# Patient Record
Sex: FEMALE | Race: WHITE | URBAN - METROPOLITAN AREA
[De-identification: names, ages, dates, MRNs, and addresses within clinical notes are randomized per-mention and may not be internally consistent; named-entity substitution may affect disease eponyms.]

---

## 2017-05-05 ENCOUNTER — HISTORICAL (OUTPATIENT)
Dept: ADMINISTRATIVE | Facility: HOSPITAL | Age: 82
End: 2017-05-05

## 2017-05-05 LAB
ABS NEUT (OLG): 6.15
ALBUMIN SERPL-MCNC: 3.3 GM/DL (ref 3.4–5)
ALBUMIN/GLOB SERPL: 1 RATIO (ref 1.1–2)
ALP SERPL-CCNC: 87 UNIT/L (ref 50–136)
ALT SERPL-CCNC: 27 UNIT/L (ref 12–78)
APTT PPP: 23.8 SECOND(S) (ref 23–32)
AST SERPL-CCNC: 25 UNIT/L (ref 10–37)
BASOPHILS # BLD AUTO: 0.02 X10(3)/MCL
BASOPHILS NFR BLD AUTO: 0.2 %
BILIRUB SERPL-MCNC: 0.3 MG/DL (ref 0.2–1)
BILIRUBIN DIRECT+TOT PNL SERPL-MCNC: 0.08 MG/DL (ref 0.05–0.2)
BILIRUBIN DIRECT+TOT PNL SERPL-MCNC: 0.22 MG/DL
BUN SERPL-MCNC: 15 MG/DL (ref 7–18)
CALCIUM SERPL-MCNC: 8 MG/DL (ref 8.5–10.1)
CHLORIDE SERPL-SCNC: 105 MMOL/L (ref 98–107)
CO2 SERPL-SCNC: 26.7 MMOL/L (ref 21–32)
CREAT SERPL-MCNC: 0.95 MG/DL (ref 0.55–1.02)
EOSINOPHIL # BLD AUTO: 0.3 X10(3)/MCL
EOSINOPHIL NFR BLD AUTO: 3.6 %
ERYTHROCYTE [DISTWIDTH] IN BLOOD BY AUTOMATED COUNT: 19 %
GLOBULIN SER-MCNC: 3.2 GM/DL (ref 2.4–3.5)
GLUCOSE SERPL-MCNC: 148 MG/DL (ref 74–106)
HCT VFR BLD AUTO: 34.7 % (ref 34–46)
HGB BLD-MCNC: 10.9 GM/DL (ref 11.3–15.4)
IMM GRANULOCYTES # BLD AUTO: 0.03 10*3/UL (ref 0–0.1)
IMM GRANULOCYTES NFR BLD AUTO: 0.4 % (ref 0–1)
INR PPP: 0.95
LYMPHOCYTES # BLD AUTO: 0.93 X10(3)/MCL
LYMPHOCYTES NFR BLD AUTO: 11.2 %
MCH RBC QN AUTO: 28.8 PG (ref 27–33)
MCHC RBC AUTO-ENTMCNC: 31.4 GM/DL (ref 32–35)
MCV RBC AUTO: 91.8 FL (ref 81–97)
MONOCYTES # BLD AUTO: 0.89 X10(3)/MCL
MONOCYTES NFR BLD AUTO: 10.7 %
NEUTROPHILS # BLD AUTO: 6.15 X10(3)/MCL
NEUTROPHILS NFR BLD AUTO: 73.9 %
PLATELET # BLD AUTO: 269 X10(3)/MCL (ref 151–368)
PMV BLD AUTO: 10 FL
POTASSIUM SERPL-SCNC: 4 MMOL/L (ref 3.5–5.1)
PROT SERPL-MCNC: 6.5 GM/DL (ref 6.4–8.2)
PROTHROMBIN TIME: 9.6 SECOND(S) (ref 9–11.5)
RBC # BLD AUTO: 3.78 X10(6)/MCL (ref 3.9–5)
SODIUM SERPL-SCNC: 138 MMOL/L (ref 136–145)
WBC # SPEC AUTO: 8.32 X10(3)/MCL (ref 3.4–9.2)

## 2017-05-17 ENCOUNTER — HISTORICAL (OUTPATIENT)
Dept: ADMINISTRATIVE | Facility: HOSPITAL | Age: 82
End: 2017-05-17

## 2017-06-08 ENCOUNTER — HISTORICAL (OUTPATIENT)
Dept: ADMINISTRATIVE | Facility: HOSPITAL | Age: 82
End: 2017-06-08

## 2017-06-08 LAB — DEPRECATED CALCIDIOL+CALCIFEROL SERPL-MC: 44.13 NG/ML (ref 30–80)

## 2017-07-03 ENCOUNTER — HISTORICAL (OUTPATIENT)
Dept: ADMINISTRATIVE | Facility: HOSPITAL | Age: 82
End: 2017-07-03

## 2017-07-03 LAB
ABS NEUT (OLG): 5.95
ALBUMIN SERPL-MCNC: 3.6 GM/DL (ref 3.4–5)
ALBUMIN/GLOB SERPL: 1 RATIO (ref 1.1–2)
ALP SERPL-CCNC: 104 UNIT/L (ref 50–136)
ALT SERPL-CCNC: 38 UNIT/L (ref 12–78)
AST SERPL-CCNC: 33 UNIT/L (ref 10–37)
BASOPHILS # BLD AUTO: 0.02 X10(3)/MCL
BASOPHILS NFR BLD AUTO: 0.3 %
BILIRUB SERPL-MCNC: 0.6 MG/DL (ref 0.2–1)
BILIRUBIN DIRECT+TOT PNL SERPL-MCNC: 0.16 MG/DL (ref 0.05–0.2)
BILIRUBIN DIRECT+TOT PNL SERPL-MCNC: 0.44 MG/DL
BUN SERPL-MCNC: 14 MG/DL (ref 7–18)
CALCIUM SERPL-MCNC: 9.4 MG/DL (ref 8.5–10.1)
CHLORIDE SERPL-SCNC: 102 MMOL/L (ref 98–107)
CO2 SERPL-SCNC: 29.6 MMOL/L (ref 21–32)
CREAT SERPL-MCNC: 0.99 MG/DL (ref 0.55–1.02)
EOSINOPHIL # BLD AUTO: 0.34 X10(3)/MCL
EOSINOPHIL NFR BLD AUTO: 4.3 %
ERYTHROCYTE [DISTWIDTH] IN BLOOD BY AUTOMATED COUNT: 16 %
GLOBULIN SER-MCNC: 3.6 GM/DL (ref 2.4–3.5)
GLUCOSE SERPL-MCNC: 93 MG/DL (ref 74–106)
HCT VFR BLD AUTO: 38.5 % (ref 34–46)
HGB BLD-MCNC: 12.3 GM/DL (ref 11.3–15.4)
IMM GRANULOCYTES # BLD AUTO: 0.02 10*3/UL (ref 0–0.1)
IMM GRANULOCYTES NFR BLD AUTO: 0.3 % (ref 0–1)
LYMPHOCYTES # BLD AUTO: 1.18 X10(3)/MCL
LYMPHOCYTES NFR BLD AUTO: 14.9 %
MCH RBC QN AUTO: 30 PG (ref 27–33)
MCHC RBC AUTO-ENTMCNC: 31.9 GM/DL (ref 32–35)
MCV RBC AUTO: 93.9 FL (ref 81–97)
MONOCYTES # BLD AUTO: 0.43 X10(3)/MCL
MONOCYTES NFR BLD AUTO: 5.4 %
NEUTROPHILS # BLD AUTO: 5.95 X10(3)/MCL
NEUTROPHILS NFR BLD AUTO: 74.8 %
PLATELET # BLD AUTO: 308 X10(3)/MCL (ref 151–368)
PMV BLD AUTO: 10 FL
POTASSIUM SERPL-SCNC: 3.8 MMOL/L (ref 3.5–5.1)
PROT SERPL-MCNC: 7.2 GM/DL (ref 6.4–8.2)
RBC # BLD AUTO: 4.1 X10(6)/MCL (ref 3.9–5)
SODIUM SERPL-SCNC: 141 MMOL/L (ref 136–145)
WBC # SPEC AUTO: 7.94 X10(3)/MCL (ref 3.4–9.2)

## 2017-10-06 ENCOUNTER — HISTORICAL (OUTPATIENT)
Dept: ADMINISTRATIVE | Facility: HOSPITAL | Age: 82
End: 2017-10-06

## 2017-10-06 LAB
ABS NEUT (OLG): 3.33
ALBUMIN SERPL-MCNC: 3.4 GM/DL (ref 3.4–5)
ALBUMIN/GLOB SERPL: 1 RATIO (ref 1.1–2)
ALP SERPL-CCNC: 75 UNIT/L (ref 50–136)
ALT SERPL-CCNC: 28 UNIT/L (ref 12–78)
AST SERPL-CCNC: 22 UNIT/L (ref 10–37)
BASOPHILS # BLD AUTO: 0.01 X10(3)/MCL
BASOPHILS NFR BLD AUTO: 0.2 %
BILIRUB SERPL-MCNC: 0.5 MG/DL (ref 0.2–1)
BILIRUBIN DIRECT+TOT PNL SERPL-MCNC: 0.12 MG/DL (ref 0.05–0.2)
BILIRUBIN DIRECT+TOT PNL SERPL-MCNC: 0.38 MG/DL
BUN SERPL-MCNC: 16 MG/DL (ref 7–18)
CALCIUM SERPL-MCNC: 8.8 MG/DL (ref 8.5–10.1)
CHLORIDE SERPL-SCNC: 106 MMOL/L (ref 98–107)
CO2 SERPL-SCNC: 29 MMOL/L (ref 21–32)
CREAT SERPL-MCNC: 0.85 MG/DL (ref 0.55–1.02)
EOSINOPHIL # BLD AUTO: 0.57 X10(3)/MCL
EOSINOPHIL NFR BLD AUTO: 9.8 %
ERYTHROCYTE [DISTWIDTH] IN BLOOD BY AUTOMATED COUNT: 17 %
GLOBULIN SER-MCNC: 3.3 GM/DL (ref 2.4–3.5)
GLUCOSE SERPL-MCNC: 93 MG/DL (ref 74–106)
HCT VFR BLD AUTO: 35.2 % (ref 34–46)
HGB BLD-MCNC: 11.2 GM/DL (ref 11.3–15.4)
IMM GRANULOCYTES # BLD AUTO: 0.02 10*3/UL (ref 0–0.1)
IMM GRANULOCYTES NFR BLD AUTO: 0.3 % (ref 0–1)
LYMPHOCYTES # BLD AUTO: 1.02 X10(3)/MCL
LYMPHOCYTES NFR BLD AUTO: 17.6 %
MCH RBC QN AUTO: 29.6 PG (ref 27–33)
MCHC RBC AUTO-ENTMCNC: 31.8 GM/DL (ref 32–35)
MCV RBC AUTO: 93.1 FL (ref 81–97)
MONOCYTES # BLD AUTO: 0.86 X10(3)/MCL
MONOCYTES NFR BLD AUTO: 14.8 %
NEUTROPHILS # BLD AUTO: 3.33 X10(3)/MCL
NEUTROPHILS NFR BLD AUTO: 57.3 %
PLATELET # BLD AUTO: 242 X10(3)/MCL (ref 151–368)
PMV BLD AUTO: 10 FL
POTASSIUM SERPL-SCNC: 4 MMOL/L (ref 3.5–5.1)
PROT SERPL-MCNC: 6.7 GM/DL (ref 6.4–8.2)
RBC # BLD AUTO: 3.78 X10(6)/MCL (ref 3.9–5)
SODIUM SERPL-SCNC: 141 MMOL/L (ref 136–145)
WBC # SPEC AUTO: 5.81 X10(3)/MCL (ref 3.4–9.2)

## 2018-05-01 ENCOUNTER — HISTORICAL (OUTPATIENT)
Dept: ADMINISTRATIVE | Facility: HOSPITAL | Age: 83
End: 2018-05-01

## 2018-05-20 ENCOUNTER — HISTORICAL (OUTPATIENT)
Dept: ADMINISTRATIVE | Facility: HOSPITAL | Age: 83
End: 2018-05-20

## 2018-05-20 LAB
ABS NEUT (OLG): 7.2 X10(3)/MCL (ref 2.1–9.2)
ALBUMIN SERPL-MCNC: 3.4 GM/DL (ref 3.4–5)
ALBUMIN/GLOB SERPL: 1.3 {RATIO}
ALP SERPL-CCNC: 65 UNIT/L (ref 38–126)
ALT SERPL-CCNC: 38 UNIT/L (ref 12–78)
APPEARANCE, UA: CLEAR
APTT PPP: 20.1 SECOND(S) (ref 24.8–36.9)
AST SERPL-CCNC: 50 UNIT/L (ref 15–37)
BACTERIA SPEC CULT: ABNORMAL /HPF
BASOPHILS # BLD AUTO: 0 X10(3)/MCL (ref 0–0.2)
BASOPHILS NFR BLD AUTO: 0 %
BILIRUB SERPL-MCNC: 0.3 MG/DL (ref 0.2–1)
BILIRUB UR QL STRIP: NEGATIVE
BILIRUBIN DIRECT+TOT PNL SERPL-MCNC: 0.1 MG/DL (ref 0–0.5)
BILIRUBIN DIRECT+TOT PNL SERPL-MCNC: 0.2 MG/DL (ref 0–0.8)
BUN SERPL-MCNC: 19 MG/DL (ref 7–18)
CALCIUM SERPL-MCNC: 8.2 MG/DL (ref 8.5–10.1)
CHLORIDE SERPL-SCNC: 106 MMOL/L (ref 98–107)
CO2 SERPL-SCNC: 27 MMOL/L (ref 21–32)
COLOR UR: YELLOW
CREAT SERPL-MCNC: 1.11 MG/DL (ref 0.55–1.02)
EOSINOPHIL # BLD AUTO: 0.2 X10(3)/MCL (ref 0–0.9)
EOSINOPHIL NFR BLD AUTO: 2 %
ERYTHROCYTE [DISTWIDTH] IN BLOOD BY AUTOMATED COUNT: 16.4 % (ref 11.5–17)
GLOBULIN SER-MCNC: 2.7 GM/DL (ref 2.4–3.5)
GLUCOSE (UA): NEGATIVE
GLUCOSE SERPL-MCNC: 137 MG/DL (ref 74–106)
HCT VFR BLD AUTO: 35.9 % (ref 37–47)
HGB BLD-MCNC: 11.4 GM/DL (ref 12–16)
HGB UR QL STRIP: NEGATIVE
INR PPP: 0.93 (ref 0–1.27)
KETONES UR QL STRIP: ABNORMAL
LEUKOCYTE ESTERASE UR QL STRIP: NEGATIVE
LYMPHOCYTES # BLD AUTO: 0.9 X10(3)/MCL (ref 0.6–4.6)
LYMPHOCYTES NFR BLD AUTO: 10 %
MCH RBC QN AUTO: 31.1 PG (ref 27–31)
MCHC RBC AUTO-ENTMCNC: 31.8 GM/DL (ref 33–36)
MCV RBC AUTO: 97.8 FL (ref 80–94)
MONOCYTES # BLD AUTO: 0.6 X10(3)/MCL (ref 0.1–1.3)
MONOCYTES NFR BLD AUTO: 6 %
NEUTROPHILS # BLD AUTO: 7.2 X10(3)/MCL (ref 1.4–7.9)
NEUTROPHILS NFR BLD AUTO: 80 %
NITRITE UR QL STRIP: NEGATIVE
PH UR STRIP: 6 [PH] (ref 5–9)
PLATELET # BLD AUTO: 232 X10(3)/MCL (ref 130–400)
PMV BLD AUTO: 10.4 FL (ref 9.4–12.4)
POTASSIUM SERPL-SCNC: 4.3 MMOL/L (ref 3.5–5.1)
PROT SERPL-MCNC: 6.1 GM/DL (ref 6.4–8.2)
PROT UR QL STRIP: NEGATIVE
PROTHROMBIN TIME: 12.7 SECOND(S) (ref 12.2–14.7)
RBC # BLD AUTO: 3.67 X10(6)/MCL (ref 4.2–5.4)
RBC #/AREA URNS HPF: ABNORMAL /[HPF]
SODIUM SERPL-SCNC: 141 MMOL/L (ref 136–145)
SP GR UR STRIP: 1.02 (ref 1–1.03)
SQUAMOUS EPITHELIAL, UA: ABNORMAL
UROBILINOGEN UR STRIP-ACNC: 1
WBC # SPEC AUTO: 8.9 X10(3)/MCL (ref 4.5–11.5)
WBC #/AREA URNS HPF: ABNORMAL /[HPF]

## 2018-05-21 LAB
BUN SERPL-MCNC: 19 MG/DL (ref 7–18)
CALCIUM SERPL-MCNC: 7.2 MG/DL (ref 8.5–10.1)
CHLORIDE SERPL-SCNC: 106 MMOL/L (ref 98–107)
CO2 SERPL-SCNC: 26 MMOL/L (ref 21–32)
CREAT SERPL-MCNC: 1.03 MG/DL (ref 0.55–1.02)
CREAT/UREA NIT SERPL: 18.4
CROSSMATCH INTERPRETATION: NORMAL
GLUCOSE SERPL-MCNC: 177 MG/DL (ref 74–106)
GROUP & RH: NORMAL
HCT VFR BLD AUTO: 24.2 % (ref 37–47)
HGB BLD-MCNC: 7.5 GM/DL (ref 12–16)
POTASSIUM SERPL-SCNC: 4.2 MMOL/L (ref 3.5–5.1)
PRODUCT READY: NORMAL
SODIUM SERPL-SCNC: 141 MMOL/L (ref 136–145)

## 2018-05-22 LAB
ABS NEUT (OLG): 10.47 X10(3)/MCL (ref 2.1–9.2)
BASOPHILS # BLD AUTO: 0 X10(3)/MCL (ref 0–0.2)
BASOPHILS NFR BLD AUTO: 0 %
BUN SERPL-MCNC: 17 MG/DL (ref 7–18)
CALCIUM SERPL-MCNC: 7.3 MG/DL (ref 8.5–10.1)
CHLORIDE SERPL-SCNC: 105 MMOL/L (ref 98–107)
CO2 SERPL-SCNC: 27 MMOL/L (ref 21–32)
CREAT SERPL-MCNC: 0.87 MG/DL (ref 0.55–1.02)
CREAT/UREA NIT SERPL: 19.5
EOSINOPHIL # BLD AUTO: 0 X10(3)/MCL (ref 0–0.9)
EOSINOPHIL NFR BLD AUTO: 0 %
ERYTHROCYTE [DISTWIDTH] IN BLOOD BY AUTOMATED COUNT: 19.6 % (ref 11.5–17)
GLUCOSE SERPL-MCNC: 119 MG/DL (ref 74–106)
HCT VFR BLD AUTO: 28.8 % (ref 37–47)
HGB BLD-MCNC: 9.4 GM/DL (ref 12–16)
LYMPHOCYTES # BLD AUTO: 0.9 X10(3)/MCL (ref 0.6–4.6)
LYMPHOCYTES NFR BLD AUTO: 7 %
MCH RBC QN AUTO: 29.8 PG (ref 27–31)
MCHC RBC AUTO-ENTMCNC: 32.6 GM/DL (ref 33–36)
MCV RBC AUTO: 91.4 FL (ref 80–94)
MONOCYTES # BLD AUTO: 0.9 X10(3)/MCL (ref 0.1–1.3)
MONOCYTES NFR BLD AUTO: 7 %
NEUTROPHILS # BLD AUTO: 10.47 X10(3)/MCL (ref 1.4–7.9)
NEUTROPHILS NFR BLD AUTO: 84 %
PLATELET # BLD AUTO: 135 X10(3)/MCL (ref 130–400)
PMV BLD AUTO: 10.7 FL (ref 9.4–12.4)
POTASSIUM SERPL-SCNC: 3.9 MMOL/L (ref 3.5–5.1)
RBC # BLD AUTO: 3.15 X10(6)/MCL (ref 4.2–5.4)
SODIUM SERPL-SCNC: 137 MMOL/L (ref 136–145)
WBC # SPEC AUTO: 12.4 X10(3)/MCL (ref 4.5–11.5)

## 2018-05-23 LAB
ABS NEUT (OLG): 8.41 X10(3)/MCL (ref 2.1–9.2)
BASOPHILS # BLD AUTO: 0 X10(3)/MCL (ref 0–0.2)
BASOPHILS NFR BLD AUTO: 0 %
BUN SERPL-MCNC: 13 MG/DL (ref 7–18)
CALCIUM SERPL-MCNC: 7.1 MG/DL (ref 8.5–10.1)
CHLORIDE SERPL-SCNC: 107 MMOL/L (ref 98–107)
CO2 SERPL-SCNC: 27 MMOL/L (ref 21–32)
CREAT SERPL-MCNC: 0.7 MG/DL (ref 0.55–1.02)
CREAT/UREA NIT SERPL: 18.6
EOSINOPHIL # BLD AUTO: 0.1 X10(3)/MCL (ref 0–0.9)
EOSINOPHIL NFR BLD AUTO: 1 %
ERYTHROCYTE [DISTWIDTH] IN BLOOD BY AUTOMATED COUNT: 18.8 % (ref 11.5–17)
GLUCOSE SERPL-MCNC: 98 MG/DL (ref 74–106)
HCT VFR BLD AUTO: 25.7 % (ref 37–47)
HGB BLD-MCNC: 8.2 GM/DL (ref 12–16)
LYMPHOCYTES # BLD AUTO: 1 X10(3)/MCL (ref 0.6–4.6)
LYMPHOCYTES NFR BLD AUTO: 9 %
MCH RBC QN AUTO: 29.3 PG (ref 27–31)
MCHC RBC AUTO-ENTMCNC: 31.9 GM/DL (ref 33–36)
MCV RBC AUTO: 91.8 FL (ref 80–94)
MONOCYTES # BLD AUTO: 1.1 X10(3)/MCL (ref 0.1–1.3)
MONOCYTES NFR BLD AUTO: 10 %
NEUTROPHILS # BLD AUTO: 8.41 X10(3)/MCL (ref 2.1–9.2)
NEUTROPHILS NFR BLD AUTO: 78 %
PLATELET # BLD AUTO: 121 X10(3)/MCL (ref 130–400)
PMV BLD AUTO: 10.6 FL (ref 9.4–12.4)
POTASSIUM SERPL-SCNC: 3.6 MMOL/L (ref 3.5–5.1)
RBC # BLD AUTO: 2.8 X10(6)/MCL (ref 4.2–5.4)
SODIUM SERPL-SCNC: 140 MMOL/L (ref 136–145)
WBC # SPEC AUTO: 10.8 X10(3)/MCL (ref 4.5–11.5)

## 2018-05-24 LAB
ABS NEUT (OLG): 8.89
BASOPHILS # BLD AUTO: 0.02 X10(3)/MCL
BASOPHILS NFR BLD AUTO: 0.2 %
EOSINOPHIL # BLD AUTO: 0.13 X10(3)/MCL
EOSINOPHIL NFR BLD AUTO: 1.2 %
ERYTHROCYTE [DISTWIDTH] IN BLOOD BY AUTOMATED COUNT: 19 %
HCT VFR BLD AUTO: 25.5 % (ref 34–46)
HGB BLD-MCNC: 8.4 GM/DL (ref 11.3–15.4)
IMM GRANULOCYTES # BLD AUTO: 0.14 10*3/UL (ref 0–0.1)
IMM GRANULOCYTES NFR BLD AUTO: 1.3 % (ref 0–1)
LYMPHOCYTES # BLD AUTO: 0.74 X10(3)/MCL
LYMPHOCYTES NFR BLD AUTO: 6.7 %
MCH RBC QN AUTO: 29.9 PG (ref 27–33)
MCHC RBC AUTO-ENTMCNC: 32.9 GM/DL (ref 32–35)
MCV RBC AUTO: 90.7 FL (ref 81–97)
MONOCYTES # BLD AUTO: 1.17 X10(3)/MCL
MONOCYTES NFR BLD AUTO: 10.6 %
NEUTROPHILS # BLD AUTO: 8.89 X10(3)/MCL
NEUTROPHILS NFR BLD AUTO: 80 %
PLATELET # BLD AUTO: 173 X10(3)/MCL (ref 151–368)
PMV BLD AUTO: 10 FL
RBC # BLD AUTO: 2.81 X10(6)/MCL (ref 3.9–5)
WBC # SPEC AUTO: 11.09 X10(3)/MCL (ref 3.4–9.2)

## 2018-05-25 ENCOUNTER — HISTORICAL (OUTPATIENT)
Dept: ADMINISTRATIVE | Facility: HOSPITAL | Age: 83
End: 2018-05-25

## 2018-05-25 LAB
ABS NEUT (OLG): 8.11
BASOPHILS # BLD AUTO: 0.04 X10(3)/MCL
BASOPHILS NFR BLD AUTO: 0.4 %
BUN SERPL-MCNC: 15 MG/DL (ref 7–18)
CALCIUM SERPL-MCNC: 7.4 MG/DL (ref 8.5–10.1)
CHLORIDE SERPL-SCNC: 105 MMOL/L (ref 98–107)
CO2 SERPL-SCNC: 28 MMOL/L (ref 21–32)
CREAT SERPL-MCNC: 0.75 MG/DL (ref 0.55–1.02)
CREAT/UREA NIT SERPL: 20
EOSINOPHIL # BLD AUTO: 0.26 X10(3)/MCL
EOSINOPHIL NFR BLD AUTO: 2.4 %
ERYTHROCYTE [DISTWIDTH] IN BLOOD BY AUTOMATED COUNT: 18 %
GLUCOSE SERPL-MCNC: 108 MG/DL (ref 74–106)
HCT VFR BLD AUTO: 25.8 % (ref 34–46)
HGB BLD-MCNC: 8.5 GM/DL (ref 11.3–15.4)
IMM GRANULOCYTES # BLD AUTO: 0.17 10*3/UL (ref 0–0.1)
IMM GRANULOCYTES NFR BLD AUTO: 1.6 % (ref 0–1)
LYMPHOCYTES # BLD AUTO: 0.83 X10(3)/MCL
LYMPHOCYTES NFR BLD AUTO: 7.6 %
MCH RBC QN AUTO: 29.6 PG (ref 27–33)
MCHC RBC AUTO-ENTMCNC: 32.9 GM/DL (ref 32–35)
MCV RBC AUTO: 89.9 FL (ref 81–97)
MONOCYTES # BLD AUTO: 1.5 X10(3)/MCL
MONOCYTES NFR BLD AUTO: 13.7 %
NEUTROPHILS # BLD AUTO: 8.11 X10(3)/MCL
NEUTROPHILS NFR BLD AUTO: 74.3 %
PLATELET # BLD AUTO: 199 X10(3)/MCL (ref 151–368)
PMV BLD AUTO: 10 FL
POTASSIUM SERPL-SCNC: 3.9 MMOL/L (ref 3.5–5.1)
RBC # BLD AUTO: 2.87 X10(6)/MCL (ref 3.9–5)
SODIUM SERPL-SCNC: 137 MMOL/L (ref 136–145)
WBC # SPEC AUTO: 10.91 X10(3)/MCL (ref 3.4–9.2)

## 2018-05-26 LAB
GROUP & RH: NORMAL
HCT VFR BLD AUTO: 26.3 % (ref 34–46)
HGB BLD-MCNC: 8.7 GM/DL (ref 11.3–15.4)

## 2018-05-29 LAB
ABS NEUT (OLG): 10.4
ERYTHROCYTE [DISTWIDTH] IN BLOOD BY AUTOMATED COUNT: 18 %
GRANULOCYTES NFR BLD MANUAL: 79 % (ref 47–80)
HCT VFR BLD AUTO: 27.4 % (ref 34–46)
HGB BLD-MCNC: 9 GM/DL (ref 11.3–15.4)
LYMPHOCYTES NFR BLD MANUAL: 10 % (ref 13–40)
MACROCYTES BLD QL SMEAR: SLIGHT
MCH RBC QN AUTO: 29.9 PG (ref 27–33)
MCHC RBC AUTO-ENTMCNC: 32.8 GM/DL (ref 32–35)
MCV RBC AUTO: 91 FL (ref 81–97)
METAMYELOCYTES NFR BLD MANUAL: 1 %
MONOCYTES NFR BLD MANUAL: 6 % (ref 2–11)
NEUTS BAND NFR BLD MANUAL: 4 % (ref 0–11)
PLATELET # BLD AUTO: 390 X10(3)/MCL (ref 151–368)
PMV BLD AUTO: 9 FL
RBC # BLD AUTO: 3.01 X10(6)/MCL (ref 3.9–5)
WBC # SPEC AUTO: 13.83 X10(3)/MCL (ref 3.4–9.2)

## 2018-05-31 LAB
ABS NEUT (OLG): 8.76
ANISOCYTOSIS BLD QL SMEAR: ABNORMAL
ERYTHROCYTE [DISTWIDTH] IN BLOOD BY AUTOMATED COUNT: 18 %
HCT VFR BLD AUTO: 26.6 % (ref 34–46)
HGB BLD-MCNC: 8.6 GM/DL (ref 11.3–15.4)
LYMPHOCYTES NFR BLD MANUAL: 16 % (ref 13–40)
LYMPHOCYTES NFR BLD MANUAL: 2 %
MACROCYTES BLD QL SMEAR: SLIGHT
MCH RBC QN AUTO: 29.9 PG (ref 27–33)
MCHC RBC AUTO-ENTMCNC: 32.3 GM/DL (ref 32–35)
MCV RBC AUTO: 92.4 FL (ref 81–97)
MICROCYTES BLD QL SMEAR: ABNORMAL
MONOCYTES NFR BLD MANUAL: 4 % (ref 2–11)
NEUTROPHILS NFR BLD MANUAL: 78 % (ref 47–80)
NEUTS BAND NFR BLD MANUAL: 2 % (ref 0–11)
PLATELET # BLD AUTO: 424 X10(3)/MCL (ref 151–368)
PLATELET # BLD EST: ABNORMAL 10*3/UL
PMV BLD AUTO: 10 FL
POLYCHROMASIA BLD QL SMEAR: SLIGHT
RBC # BLD AUTO: 2.88 X10(6)/MCL (ref 3.9–5)
RBC MORPH BLD: ABNORMAL
WBC # SPEC AUTO: 12.59 X10(3)/MCL (ref 3.4–9.2)

## 2018-06-01 LAB
ABS NEUT (OLG): 13.53
ANISOCYTOSIS BLD QL SMEAR: ABNORMAL
BUN SERPL-MCNC: 18 MG/DL (ref 7–18)
CALCIUM SERPL-MCNC: 8.5 MG/DL (ref 8.5–10.1)
CHLORIDE SERPL-SCNC: 101 MMOL/L (ref 98–107)
CO2 SERPL-SCNC: 29.9 MMOL/L (ref 21–32)
CREAT SERPL-MCNC: 0.89 MG/DL (ref 0.55–1.02)
CREAT/UREA NIT SERPL: 20
EOSINOPHIL NFR BLD MANUAL: 2 % (ref 0–8)
ERYTHROCYTE [DISTWIDTH] IN BLOOD BY AUTOMATED COUNT: 18 %
GLUCOSE SERPL-MCNC: 114 MG/DL (ref 74–106)
GRANULOCYTES NFR BLD MANUAL: 85 % (ref 47–80)
HCT VFR BLD AUTO: 29.2 % (ref 34–46)
HGB BLD-MCNC: 9.4 GM/DL (ref 11.3–15.4)
LYMPHOCYTES NFR BLD MANUAL: 4 % (ref 13–40)
MCH RBC QN AUTO: 29.8 PG (ref 27–33)
MCHC RBC AUTO-ENTMCNC: 32.2 GM/DL (ref 32–35)
MCV RBC AUTO: 92.7 FL (ref 81–97)
MICROCYTES BLD QL SMEAR: SLIGHT
MONOCYTES NFR BLD MANUAL: 8 % (ref 2–11)
NEUTS BAND NFR BLD MANUAL: 1 % (ref 0–11)
PLATELET # BLD AUTO: 526 X10(3)/MCL (ref 151–368)
PLATELET # BLD EST: ABNORMAL 10*3/UL
PMV BLD AUTO: 10 FL
POTASSIUM SERPL-SCNC: 3.8 MMOL/L (ref 3.5–5.1)
RBC # BLD AUTO: 3.15 X10(6)/MCL (ref 3.9–5)
SODIUM SERPL-SCNC: 137 MMOL/L (ref 136–145)
WBC # SPEC AUTO: 17.67 X10(3)/MCL (ref 3.4–9.2)

## 2018-06-03 LAB
ABS NEUT (OLG): 10.17
BASOPHILS # BLD AUTO: 0.05 X10(3)/MCL
BASOPHILS NFR BLD AUTO: 0.4 %
EOSINOPHIL # BLD AUTO: 0.47 X10(3)/MCL
EOSINOPHIL NFR BLD AUTO: 3.8 %
ERYTHROCYTE [DISTWIDTH] IN BLOOD BY AUTOMATED COUNT: 18 %
HCT VFR BLD AUTO: 30.6 % (ref 34–46)
HGB BLD-MCNC: 9.7 GM/DL (ref 11.3–15.4)
IMM GRANULOCYTES # BLD AUTO: 0.21 10*3/UL (ref 0–0.1)
IMM GRANULOCYTES NFR BLD AUTO: 1.7 % (ref 0–1)
LYMPHOCYTES # BLD AUTO: 0.63 X10(3)/MCL
LYMPHOCYTES NFR BLD AUTO: 5.1 %
MCH RBC QN AUTO: 29.4 PG (ref 27–33)
MCHC RBC AUTO-ENTMCNC: 31.7 GM/DL (ref 32–35)
MCV RBC AUTO: 92.7 FL (ref 81–97)
MONOCYTES # BLD AUTO: 0.71 X10(3)/MCL
MONOCYTES NFR BLD AUTO: 5.8 %
NEUTROPHILS # BLD AUTO: 10.17 X10(3)/MCL
NEUTROPHILS NFR BLD AUTO: 83.2 %
PLATELET # BLD AUTO: 601 X10(3)/MCL (ref 151–368)
PMV BLD AUTO: 10 FL
RBC # BLD AUTO: 3.3 X10(6)/MCL (ref 3.9–5)
WBC # SPEC AUTO: 12.24 X10(3)/MCL (ref 3.4–9.2)

## 2018-06-08 LAB
ABS NEUT (OLG): 8.32
BASOPHILS # BLD AUTO: 0.06 X10(3)/MCL
BASOPHILS NFR BLD AUTO: 0.5 %
BUN SERPL-MCNC: 11 MG/DL (ref 7–18)
CALCIUM SERPL-MCNC: 7.9 MG/DL (ref 8.5–10.1)
CHLORIDE SERPL-SCNC: 105 MMOL/L (ref 98–107)
CO2 SERPL-SCNC: 29.8 MMOL/L (ref 21–32)
CREAT SERPL-MCNC: 0.83 MG/DL (ref 0.55–1.02)
CREAT/UREA NIT SERPL: 13
EOSINOPHIL # BLD AUTO: 0.58 X10(3)/MCL
EOSINOPHIL NFR BLD AUTO: 5 %
ERYTHROCYTE [DISTWIDTH] IN BLOOD BY AUTOMATED COUNT: 18 %
GLUCOSE SERPL-MCNC: 102 MG/DL (ref 74–106)
HCT VFR BLD AUTO: 30.8 % (ref 34–46)
HGB BLD-MCNC: 9.8 GM/DL (ref 11.3–15.4)
IMM GRANULOCYTES # BLD AUTO: 0.22 10*3/UL (ref 0–0.1)
IMM GRANULOCYTES NFR BLD AUTO: 1.9 % (ref 0–1)
LYMPHOCYTES # BLD AUTO: 0.93 X10(3)/MCL
LYMPHOCYTES NFR BLD AUTO: 8.1 %
MCH RBC QN AUTO: 30 PG (ref 27–33)
MCHC RBC AUTO-ENTMCNC: 31.8 GM/DL (ref 32–35)
MCV RBC AUTO: 94.2 FL (ref 81–97)
MONOCYTES # BLD AUTO: 1.43 X10(3)/MCL
MONOCYTES NFR BLD AUTO: 12.4 %
NEUTROPHILS # BLD AUTO: 8.32 X10(3)/MCL
NEUTROPHILS NFR BLD AUTO: 72.1 %
PLATELET # BLD AUTO: 523 X10(3)/MCL (ref 151–368)
PMV BLD AUTO: 10 FL
POTASSIUM SERPL-SCNC: 3.9 MMOL/L (ref 3.5–5.1)
RBC # BLD AUTO: 3.27 X10(6)/MCL (ref 3.9–5)
SODIUM SERPL-SCNC: 139 MMOL/L (ref 136–145)
WBC # SPEC AUTO: 11.54 X10(3)/MCL (ref 3.4–9.2)

## 2018-06-15 LAB
ABS NEUT (OLG): 6.1
BASOPHILS # BLD AUTO: 0.05 X10(3)/MCL
BASOPHILS NFR BLD AUTO: 0.5 %
BUN SERPL-MCNC: 16 MG/DL (ref 7–18)
CALCIUM SERPL-MCNC: 8.4 MG/DL (ref 8.5–10.1)
CHLORIDE SERPL-SCNC: 103 MMOL/L (ref 98–107)
CO2 SERPL-SCNC: 30.5 MMOL/L (ref 21–32)
CREAT SERPL-MCNC: 0.88 MG/DL (ref 0.55–1.02)
CREAT/UREA NIT SERPL: 18
EOSINOPHIL # BLD AUTO: 0.74 X10(3)/MCL
EOSINOPHIL NFR BLD AUTO: 7.6 %
ERYTHROCYTE [DISTWIDTH] IN BLOOD BY AUTOMATED COUNT: 18 %
GLUCOSE SERPL-MCNC: 95 MG/DL (ref 74–106)
HCT VFR BLD AUTO: 32.7 % (ref 34–46)
HGB BLD-MCNC: 10.1 GM/DL (ref 11.3–15.4)
IMM GRANULOCYTES # BLD AUTO: 0.28 10*3/UL (ref 0–0.1)
IMM GRANULOCYTES NFR BLD AUTO: 2.9 % (ref 0–1)
LYMPHOCYTES # BLD AUTO: 1.3 X10(3)/MCL
LYMPHOCYTES NFR BLD AUTO: 13.4 %
MCH RBC QN AUTO: 29.3 PG (ref 27–33)
MCHC RBC AUTO-ENTMCNC: 30.9 GM/DL (ref 32–35)
MCV RBC AUTO: 94.8 FL (ref 81–97)
MONOCYTES # BLD AUTO: 1.26 X10(3)/MCL
MONOCYTES NFR BLD AUTO: 12.9 %
NEUTROPHILS # BLD AUTO: 6.1 X10(3)/MCL
NEUTROPHILS NFR BLD AUTO: 62.7 %
PLATELET # BLD AUTO: 347 X10(3)/MCL (ref 151–368)
PMV BLD AUTO: 10 FL
POTASSIUM SERPL-SCNC: 4.3 MMOL/L (ref 3.5–5.1)
RBC # BLD AUTO: 3.45 X10(6)/MCL (ref 3.9–5)
SODIUM SERPL-SCNC: 138 MMOL/L (ref 136–145)
WBC # SPEC AUTO: 9.73 X10(3)/MCL (ref 3.4–9.2)

## 2018-06-22 LAB
ABS NEUT (OLG): 7.78
BASOPHILS # BLD AUTO: 0.06 X10(3)/MCL
BASOPHILS NFR BLD AUTO: 0.5 %
BUN SERPL-MCNC: 26 MG/DL (ref 7–18)
CALCIUM SERPL-MCNC: 8.2 MG/DL (ref 8.5–10.1)
CHLORIDE SERPL-SCNC: 103 MMOL/L (ref 98–107)
CO2 SERPL-SCNC: 29.9 MMOL/L (ref 21–32)
CREAT SERPL-MCNC: 0.93 MG/DL (ref 0.55–1.02)
CREAT/UREA NIT SERPL: 28
EOSINOPHIL # BLD AUTO: 0.38 X10(3)/MCL
EOSINOPHIL NFR BLD AUTO: 3.4 %
ERYTHROCYTE [DISTWIDTH] IN BLOOD BY AUTOMATED COUNT: 18 %
GLUCOSE SERPL-MCNC: 92 MG/DL (ref 74–106)
HCT VFR BLD AUTO: 32.8 % (ref 34–46)
HGB BLD-MCNC: 10.1 GM/DL (ref 11.3–15.4)
IMM GRANULOCYTES # BLD AUTO: 0.22 10*3/UL (ref 0–0.1)
IMM GRANULOCYTES NFR BLD AUTO: 2 % (ref 0–1)
LYMPHOCYTES # BLD AUTO: 1.64 X10(3)/MCL
LYMPHOCYTES NFR BLD AUTO: 14.6 %
MCH RBC QN AUTO: 29.3 PG (ref 27–33)
MCHC RBC AUTO-ENTMCNC: 30.8 GM/DL (ref 32–35)
MCV RBC AUTO: 95.1 FL (ref 81–97)
MONOCYTES # BLD AUTO: 1.15 X10(3)/MCL
MONOCYTES NFR BLD AUTO: 10.2 %
NEUTROPHILS # BLD AUTO: 7.78 X10(3)/MCL
NEUTROPHILS NFR BLD AUTO: 69.3 %
PLATELET # BLD AUTO: 290 X10(3)/MCL (ref 151–368)
PMV BLD AUTO: 10 FL
POTASSIUM SERPL-SCNC: 4.2 MMOL/L (ref 3.5–5.1)
RBC # BLD AUTO: 3.45 X10(6)/MCL (ref 3.9–5)
SODIUM SERPL-SCNC: 138 MMOL/L (ref 136–145)
WBC # SPEC AUTO: 11.23 X10(3)/MCL (ref 3.4–9.2)

## 2018-07-09 ENCOUNTER — HISTORICAL (OUTPATIENT)
Dept: ADMINISTRATIVE | Facility: HOSPITAL | Age: 83
End: 2018-07-09

## 2018-07-23 ENCOUNTER — HISTORICAL (OUTPATIENT)
Dept: ADMINISTRATIVE | Facility: HOSPITAL | Age: 83
End: 2018-07-23

## 2018-07-23 LAB
ABS NEUT (OLG): 5.97
ALBUMIN SERPL-MCNC: 3.4 GM/DL (ref 3.4–5)
ALBUMIN/GLOB SERPL: 0.9 RATIO (ref 1.1–2)
ALP SERPL-CCNC: 139 UNIT/L (ref 46–116)
ALT SERPL-CCNC: 35 UNIT/L (ref 12–78)
AST SERPL-CCNC: 38 UNIT/L (ref 10–37)
BASOPHILS # BLD AUTO: 0.02 X10(3)/MCL
BASOPHILS NFR BLD AUTO: 0.3 %
BILIRUB SERPL-MCNC: 0.4 MG/DL (ref 0.2–1)
BILIRUBIN DIRECT+TOT PNL SERPL-MCNC: 0.14 MG/DL (ref 0–0.2)
BILIRUBIN DIRECT+TOT PNL SERPL-MCNC: 0.26 MG/DL
BUN SERPL-MCNC: 13 MG/DL (ref 7–18)
CALCIUM SERPL-MCNC: 9.2 MG/DL (ref 8.5–10.1)
CHLORIDE SERPL-SCNC: 104 MMOL/L (ref 98–107)
CO2 SERPL-SCNC: 32.5 MMOL/L (ref 21–32)
CREAT SERPL-MCNC: 0.89 MG/DL (ref 0.55–1.02)
DEPRECATED CALCIDIOL+CALCIFEROL SERPL-MC: 78.6 NG/ML (ref 30–100)
EOSINOPHIL # BLD AUTO: 0.23 X10(3)/MCL
EOSINOPHIL NFR BLD AUTO: 3 %
ERYTHROCYTE [DISTWIDTH] IN BLOOD BY AUTOMATED COUNT: 18 %
ERYTHROCYTE [SEDIMENTATION RATE] IN BLOOD: 21 MM/HR (ref 0–20)
GLOBULIN SER-MCNC: 3.8 GM/DL (ref 2.4–3.5)
GLUCOSE SERPL-MCNC: 91 MG/DL (ref 74–106)
HCT VFR BLD AUTO: 39 % (ref 34–46)
HGB BLD-MCNC: 12.5 GM/DL (ref 11.3–15.4)
IMM GRANULOCYTES # BLD AUTO: 0.02 10*3/UL (ref 0–0.1)
IMM GRANULOCYTES NFR BLD AUTO: 0.3 % (ref 0–1)
LYMPHOCYTES # BLD AUTO: 0.94 X10(3)/MCL
LYMPHOCYTES NFR BLD AUTO: 12.2 %
MAGNESIUM SERPL-MCNC: 1.9 MG/DL (ref 1.8–2.4)
MCH RBC QN AUTO: 30 PG (ref 27–33)
MCHC RBC AUTO-ENTMCNC: 32.1 GM/DL (ref 32–35)
MCV RBC AUTO: 93.5 FL (ref 81–97)
MONOCYTES # BLD AUTO: 0.54 X10(3)/MCL
MONOCYTES NFR BLD AUTO: 7 %
NEUTROPHILS # BLD AUTO: 5.97 X10(3)/MCL
NEUTROPHILS NFR BLD AUTO: 77.2 %
PLATELET # BLD AUTO: 299 X10(3)/MCL (ref 151–368)
PMV BLD AUTO: 10 FL
POTASSIUM SERPL-SCNC: 4 MMOL/L (ref 3.5–5.1)
PROT SERPL-MCNC: 7.2 GM/DL (ref 6.4–8.2)
RBC # BLD AUTO: 4.17 X10(6)/MCL (ref 3.9–5)
SODIUM SERPL-SCNC: 140 MMOL/L (ref 136–145)
WBC # SPEC AUTO: 7.72 X10(3)/MCL (ref 3.4–9.2)

## 2018-08-20 ENCOUNTER — HISTORICAL (OUTPATIENT)
Dept: ADMINISTRATIVE | Facility: HOSPITAL | Age: 83
End: 2018-08-20

## 2018-09-04 ENCOUNTER — HISTORICAL (OUTPATIENT)
Dept: ADMINISTRATIVE | Facility: HOSPITAL | Age: 83
End: 2018-09-04

## 2018-09-04 LAB
ABS NEUT (OLG): 6.43
APTT PPP: 23 SECOND(S) (ref 24.5–32.8)
BASOPHILS # BLD AUTO: 0.03 X10(3)/MCL
BASOPHILS NFR BLD AUTO: 0.3 %
EOSINOPHIL # BLD AUTO: 0.33 X10(3)/MCL
EOSINOPHIL NFR BLD AUTO: 3.8 %
ERYTHROCYTE [DISTWIDTH] IN BLOOD BY AUTOMATED COUNT: 18 %
HCT VFR BLD AUTO: 41.9 % (ref 34–46)
HGB BLD-MCNC: 13.4 GM/DL (ref 11.3–15.4)
IMM GRANULOCYTES # BLD AUTO: 0.03 10*3/UL (ref 0–0.1)
IMM GRANULOCYTES NFR BLD AUTO: 0.3 % (ref 0–1)
INR PPP: 0.9
LYMPHOCYTES # BLD AUTO: 1.16 X10(3)/MCL
LYMPHOCYTES NFR BLD AUTO: 13.3 %
MCH RBC QN AUTO: 29.3 PG (ref 27–33)
MCHC RBC AUTO-ENTMCNC: 32 GM/DL (ref 32–35)
MCV RBC AUTO: 91.5 FL (ref 81–97)
MONOCYTES # BLD AUTO: 0.74 X10(3)/MCL
MONOCYTES NFR BLD AUTO: 8.5 %
NEUTROPHILS # BLD AUTO: 6.43 X10(3)/MCL
NEUTROPHILS NFR BLD AUTO: 73.8 %
PLATELET # BLD AUTO: 341 X10(3)/MCL (ref 151–368)
PMV BLD AUTO: 10 FL
PROTHROMBIN TIME: 9.4 SECOND(S) (ref 9.3–11.4)
RBC # BLD AUTO: 4.58 X10(6)/MCL (ref 3.9–5)
WBC # SPEC AUTO: 8.72 X10(3)/MCL (ref 3.4–9.2)

## 2018-09-13 ENCOUNTER — HISTORICAL (OUTPATIENT)
Dept: ADMINISTRATIVE | Facility: HOSPITAL | Age: 83
End: 2018-09-13

## 2018-09-13 LAB — TSH SERPL-ACNC: 0.69 MIU/ML (ref 0.35–3.75)

## 2018-09-28 ENCOUNTER — HISTORICAL (OUTPATIENT)
Dept: ADMINISTRATIVE | Facility: HOSPITAL | Age: 83
End: 2018-09-28

## 2018-09-28 LAB
ABS NEUT (OLG): 5.42
ALBUMIN SERPL-MCNC: 3.5 GM/DL (ref 3.4–5)
ALBUMIN/GLOB SERPL: 0.9 RATIO (ref 1.1–2)
ALP SERPL-CCNC: 132 UNIT/L (ref 46–116)
ALT SERPL-CCNC: 29 UNIT/L (ref 12–78)
APPEARANCE, UA: CLEAR
AST SERPL-CCNC: 27 UNIT/L (ref 10–37)
BACTERIA SPEC CULT: NORMAL
BASOPHILS # BLD AUTO: 0.02 X10(3)/MCL
BASOPHILS NFR BLD AUTO: 0.2 %
BILIRUB SERPL-MCNC: 0.3 MG/DL (ref 0.2–1)
BILIRUB UR QL STRIP: NEGATIVE
BILIRUBIN DIRECT+TOT PNL SERPL-MCNC: 0.1 MG/DL (ref 0–0.2)
BILIRUBIN DIRECT+TOT PNL SERPL-MCNC: 0.2 MG/DL
BUN SERPL-MCNC: 12 MG/DL (ref 7–18)
CALCIUM SERPL-MCNC: 8.4 MG/DL (ref 8.5–10.1)
CHLORIDE SERPL-SCNC: 101 MMOL/L (ref 98–107)
CHOLEST SERPL-MCNC: 203 MG/DL (ref 50–200)
CHOLEST/HDLC SERPL: 3 {RATIO} (ref 0–5)
CO2 SERPL-SCNC: 28.9 MMOL/L (ref 21–32)
COLOR UR: YELLOW
CREAT SERPL-MCNC: 0.69 MG/DL (ref 0.55–1.02)
EOSINOPHIL # BLD AUTO: 0.47 X10(3)/MCL
EOSINOPHIL NFR BLD AUTO: 5.8 %
ERYTHROCYTE [DISTWIDTH] IN BLOOD BY AUTOMATED COUNT: 18 %
EST. AVERAGE GLUCOSE BLD GHB EST-MCNC: 123 MG/DL
GLOBULIN SER-MCNC: 3.8 GM/DL (ref 2.4–3.5)
GLUCOSE (UA): NEGATIVE
GLUCOSE SERPL-MCNC: 96 MG/DL (ref 74–106)
HBA1C MFR BLD: 5.9 % (ref 4.5–6.2)
HCT VFR BLD AUTO: 38.5 % (ref 34–46)
HDLC SERPL-MCNC: 66 MG/DL (ref 35–60)
HGB BLD-MCNC: 12.4 GM/DL (ref 11.3–15.4)
HGB UR QL STRIP: NEGATIVE
IMM GRANULOCYTES # BLD AUTO: 0.02 10*3/UL (ref 0–0.1)
IMM GRANULOCYTES NFR BLD AUTO: 0.2 % (ref 0–1)
KETONES UR QL STRIP: NORMAL
LDLC SERPL CALC-MCNC: 115 MG/DL (ref 50–140)
LEUKOCYTE ESTERASE UR QL STRIP: NEGATIVE
LYMPHOCYTES # BLD AUTO: 1.04 X10(3)/MCL
LYMPHOCYTES NFR BLD AUTO: 12.9 %
MCH RBC QN AUTO: 29.9 PG (ref 27–33)
MCHC RBC AUTO-ENTMCNC: 32.2 GM/DL (ref 32–35)
MCV RBC AUTO: 92.8 FL (ref 81–97)
MONOCYTES # BLD AUTO: 1.11 X10(3)/MCL
MONOCYTES NFR BLD AUTO: 13.7 %
MUCOUS THREADS URNS QL MICRO: PRESENT
NEUTROPHILS # BLD AUTO: 5.42 X10(3)/MCL
NEUTROPHILS NFR BLD AUTO: 67.2 %
NITRITE UR QL STRIP: NEGATIVE
PH UR STRIP: 6.5 [PH] (ref 4.6–8)
PLATELET # BLD AUTO: 324 X10(3)/MCL (ref 151–368)
PMV BLD AUTO: 10 FL
POTASSIUM SERPL-SCNC: 3.9 MMOL/L (ref 3.5–5.1)
PROT SERPL-MCNC: 7.3 GM/DL (ref 6.4–8.2)
PROT UR QL STRIP: NEGATIVE
RBC # BLD AUTO: 4.15 X10(6)/MCL (ref 3.9–5)
RBC #/AREA URNS HPF: NORMAL /[HPF]
SODIUM SERPL-SCNC: 136 MMOL/L (ref 136–145)
SP GR UR STRIP: 1.02 (ref 1–1.03)
SQUAMOUS EPITHELIAL, UA: NORMAL
TRIGL SERPL-MCNC: 111 MG/DL (ref 30–150)
UROBILINOGEN UR STRIP-ACNC: 0.2
VIT B12 SERPL-MCNC: 1005 PG/ML (ref 193–986)
VLDLC SERPL CALC-MCNC: 22 MG/DL
WBC # SPEC AUTO: 8.08 X10(3)/MCL (ref 3.4–9.2)
WBC #/AREA URNS HPF: NORMAL /HPF

## 2018-11-20 ENCOUNTER — HISTORICAL (OUTPATIENT)
Dept: ADMINISTRATIVE | Facility: HOSPITAL | Age: 83
End: 2018-11-20

## 2019-01-14 ENCOUNTER — HISTORICAL (OUTPATIENT)
Dept: ADMINISTRATIVE | Facility: HOSPITAL | Age: 84
End: 2019-01-14

## 2019-01-14 LAB
ABS NEUT (OLG): 5.98
ALBUMIN SERPL-MCNC: 3.3 GM/DL (ref 3.4–5)
ALBUMIN/GLOB SERPL: 0.9 RATIO (ref 1.1–2)
ALP SERPL-CCNC: 114 UNIT/L (ref 46–116)
ALT SERPL-CCNC: 32 UNIT/L (ref 12–78)
APPEARANCE, UA: CLEAR
AST SERPL-CCNC: 28 UNIT/L (ref 10–37)
BACTERIA SPEC CULT: NORMAL
BASOPHILS # BLD AUTO: 0.04 X10(3)/MCL
BASOPHILS NFR BLD AUTO: 0.5 %
BILIRUB SERPL-MCNC: 0.5 MG/DL (ref 0.2–1)
BILIRUB UR QL STRIP: NEGATIVE
BILIRUBIN DIRECT+TOT PNL SERPL-MCNC: 0.13 MG/DL (ref 0–0.2)
BILIRUBIN DIRECT+TOT PNL SERPL-MCNC: 0.37 MG/DL
BUN SERPL-MCNC: 14 MG/DL (ref 7–18)
CALCIUM SERPL-MCNC: 9.5 MG/DL (ref 8.5–10.1)
CHLORIDE SERPL-SCNC: 98 MMOL/L (ref 98–107)
CO2 SERPL-SCNC: 32.8 MMOL/L (ref 21–32)
COLOR UR: YELLOW
CREAT SERPL-MCNC: 0.95 MG/DL (ref 0.55–1.02)
EOSINOPHIL # BLD AUTO: 0.35 X10(3)/MCL
EOSINOPHIL NFR BLD AUTO: 4.2 %
ERYTHROCYTE [DISTWIDTH] IN BLOOD BY AUTOMATED COUNT: 16 %
GLOBULIN SER-MCNC: 3.7 GM/DL (ref 2.4–3.5)
GLUCOSE (UA): NEGATIVE
GLUCOSE SERPL-MCNC: 95 MG/DL (ref 74–106)
HCT VFR BLD AUTO: 41 % (ref 34–46)
HGB BLD-MCNC: 12.9 GM/DL (ref 11.3–15.4)
HGB UR QL STRIP: NEGATIVE
IMM GRANULOCYTES # BLD AUTO: 0.04 10*3/UL (ref 0–0.1)
IMM GRANULOCYTES NFR BLD AUTO: 0.5 % (ref 0–1)
KETONES UR QL STRIP: NORMAL
LEUKOCYTE ESTERASE UR QL STRIP: NEGATIVE
LYMPHOCYTES # BLD AUTO: 1.15 X10(3)/MCL
LYMPHOCYTES NFR BLD AUTO: 13.9 %
MCH RBC QN AUTO: 30.5 PG (ref 27–33)
MCHC RBC AUTO-ENTMCNC: 31.5 GM/DL (ref 32–35)
MCV RBC AUTO: 96.9 FL (ref 81–97)
MONOCYTES # BLD AUTO: 0.72 X10(3)/MCL
MONOCYTES NFR BLD AUTO: 8.7 %
MUCOUS THREADS URNS QL MICRO: PRESENT
NEUTROPHILS # BLD AUTO: 5.98 X10(3)/MCL
NEUTROPHILS NFR BLD AUTO: 72.2 %
NITRITE UR QL STRIP: NEGATIVE
PH UR STRIP: 6 [PH] (ref 4.6–8)
PLATELET # BLD AUTO: 287 X10(3)/MCL (ref 151–368)
PMV BLD AUTO: 10 FL
POTASSIUM SERPL-SCNC: 3.6 MMOL/L (ref 3.5–5.1)
PROT SERPL-MCNC: 7 GM/DL (ref 6.4–8.2)
PROT UR QL STRIP: NEGATIVE
RBC # BLD AUTO: 4.23 X10(6)/MCL (ref 3.9–5)
RBC #/AREA URNS HPF: NORMAL /[HPF]
SODIUM SERPL-SCNC: 138 MMOL/L (ref 136–145)
SP GR UR STRIP: >=1.03 (ref 1–1.03)
SQUAMOUS EPITHELIAL, UA: NORMAL
UROBILINOGEN UR STRIP-ACNC: 0.2
WBC # SPEC AUTO: 8.28 X10(3)/MCL (ref 3.4–9.2)
WBC #/AREA URNS HPF: NORMAL /HPF

## 2019-01-19 ENCOUNTER — HISTORICAL (OUTPATIENT)
Dept: ADMINISTRATIVE | Facility: HOSPITAL | Age: 84
End: 2019-01-19

## 2019-01-19 LAB
ABS NEUT (OLG): 10.84
ALBUMIN SERPL-MCNC: 3.4 GM/DL (ref 3.4–5)
ALBUMIN/GLOB SERPL: 0.9 RATIO (ref 1.1–2)
ALP SERPL-CCNC: 101 UNIT/L (ref 46–116)
ALT SERPL-CCNC: 28 UNIT/L (ref 12–78)
APPEARANCE, UA: CLEAR
AST SERPL-CCNC: 27 UNIT/L (ref 10–37)
BACTERIA SPEC CULT: ABNORMAL
BASOPHILS # BLD AUTO: 0.04 X10(3)/MCL
BASOPHILS NFR BLD AUTO: 0.3 %
BILIRUB SERPL-MCNC: 0.6 MG/DL (ref 0.2–1)
BILIRUB UR QL STRIP: NEGATIVE
BILIRUBIN DIRECT+TOT PNL SERPL-MCNC: 0.18 MG/DL (ref 0–0.2)
BILIRUBIN DIRECT+TOT PNL SERPL-MCNC: 0.42 MG/DL
BUN SERPL-MCNC: 23 MG/DL (ref 7–18)
CALCIUM SERPL-MCNC: 9.7 MG/DL (ref 8.5–10.1)
CHLORIDE SERPL-SCNC: 97 MMOL/L (ref 98–107)
CK MB SERPL-MCNC: 0.5 NG/ML (ref 0.5–3.6)
CK SERPL-CCNC: 38 MG/DL (ref 26–308)
CO2 SERPL-SCNC: 32.1 MMOL/L (ref 21–32)
COLOR UR: YELLOW
CREAT SERPL-MCNC: 1.07 MG/DL (ref 0.55–1.02)
EOSINOPHIL # BLD AUTO: 0.13 X10(3)/MCL
EOSINOPHIL NFR BLD AUTO: 0.9 %
ERYTHROCYTE [DISTWIDTH] IN BLOOD BY AUTOMATED COUNT: 16 %
GLOBULIN SER-MCNC: 3.7 GM/DL (ref 2.4–3.5)
GLUCOSE (UA): NEGATIVE
GLUCOSE SERPL-MCNC: 102 MG/DL (ref 74–106)
HCT VFR BLD AUTO: 41.5 % (ref 34–46)
HGB BLD-MCNC: 13.3 GM/DL (ref 11.3–15.4)
HGB UR QL STRIP: NEGATIVE
IMM GRANULOCYTES # BLD AUTO: 0.06 10*3/UL (ref 0–0.1)
IMM GRANULOCYTES NFR BLD AUTO: 0.4 % (ref 0–1)
INR PPP: 1.1
KETONES UR QL STRIP: ABNORMAL
LEUKOCYTE ESTERASE UR QL STRIP: NEGATIVE
LYMPHOCYTES # BLD AUTO: 1.21 X10(3)/MCL
LYMPHOCYTES NFR BLD AUTO: 8.8 %
MAGNESIUM SERPL-MCNC: 1.9 MG/DL (ref 1.8–2.4)
MCH RBC QN AUTO: 30.8 PG (ref 27–33)
MCHC RBC AUTO-ENTMCNC: 32 GM/DL (ref 32–35)
MCV RBC AUTO: 96.1 FL (ref 81–97)
MONOCYTES # BLD AUTO: 1.42 X10(3)/MCL
MONOCYTES NFR BLD AUTO: 10.4 %
NEUTROPHILS # BLD AUTO: 10.84 X10(3)/MCL
NEUTROPHILS NFR BLD AUTO: 79.2 %
NITRITE UR QL STRIP: NEGATIVE
PH UR STRIP: 6.5 [PH] (ref 4.6–8)
PLATELET # BLD AUTO: 307 X10(3)/MCL (ref 151–368)
PMV BLD AUTO: 10 FL
POTASSIUM SERPL-SCNC: 3.3 MMOL/L (ref 3.5–5.1)
PROT SERPL-MCNC: 7.1 GM/DL (ref 6.4–8.2)
PROT UR QL STRIP: NEGATIVE
PROTHROMBIN TIME: 10.4 SECOND(S) (ref 8.6–10.1)
RBC # BLD AUTO: 4.32 X10(6)/MCL (ref 3.9–5)
RBC #/AREA URNS HPF: ABNORMAL /HPF
SODIUM SERPL-SCNC: 136 MMOL/L (ref 136–145)
SP GR UR STRIP: 1.01 (ref 1–1.03)
SQUAMOUS EPITHELIAL, UA: ABNORMAL
TROPONIN I SERPL-MCNC: 0.02 NG/ML (ref 0.02–0.06)
UROBILINOGEN UR STRIP-ACNC: 0.2
WBC # SPEC AUTO: 13.7 X10(3)/MCL (ref 3.4–9.2)
WBC #/AREA URNS HPF: ABNORMAL /[HPF]

## 2019-01-20 ENCOUNTER — HISTORICAL (OUTPATIENT)
Dept: ADMINISTRATIVE | Facility: HOSPITAL | Age: 84
End: 2019-01-20

## 2019-01-20 LAB
ABS NEUT (OLG): 8.07
ALBUMIN SERPL-MCNC: 2.8 GM/DL (ref 3.4–5)
ALBUMIN/GLOB SERPL: 0.9 RATIO (ref 1.1–2)
ALP SERPL-CCNC: 83 UNIT/L (ref 46–116)
ALT SERPL-CCNC: 24 UNIT/L (ref 12–78)
AST SERPL-CCNC: 25 UNIT/L (ref 10–37)
BASOPHILS # BLD AUTO: 0.05 X10(3)/MCL
BASOPHILS NFR BLD AUTO: 0.5 %
BILIRUB SERPL-MCNC: 0.6 MG/DL (ref 0.2–1)
BILIRUBIN DIRECT+TOT PNL SERPL-MCNC: 0.18 MG/DL (ref 0–0.2)
BILIRUBIN DIRECT+TOT PNL SERPL-MCNC: 0.42 MG/DL
BUN SERPL-MCNC: 21 MG/DL (ref 7–18)
CALCIUM SERPL-MCNC: 9 MG/DL (ref 8.5–10.1)
CHLORIDE SERPL-SCNC: 103 MMOL/L (ref 98–107)
CO2 SERPL-SCNC: 29.7 MMOL/L (ref 21–32)
CREAT SERPL-MCNC: 0.89 MG/DL (ref 0.55–1.02)
EOSINOPHIL # BLD AUTO: 0.24 X10(3)/MCL
EOSINOPHIL NFR BLD AUTO: 2.3 %
ERYTHROCYTE [DISTWIDTH] IN BLOOD BY AUTOMATED COUNT: 16 %
GLOBULIN SER-MCNC: 3.1 GM/DL (ref 2.4–3.5)
GLUCOSE SERPL-MCNC: 78 MG/DL (ref 74–106)
HCT VFR BLD AUTO: 39.7 % (ref 34–46)
HGB BLD-MCNC: 12.5 GM/DL (ref 11.3–15.4)
IMM GRANULOCYTES # BLD AUTO: 0.09 10*3/UL (ref 0–0.1)
IMM GRANULOCYTES NFR BLD AUTO: 0.9 % (ref 0–1)
LYMPHOCYTES # BLD AUTO: 1.07 X10(3)/MCL
LYMPHOCYTES NFR BLD AUTO: 10.4 %
MCH RBC QN AUTO: 30.5 PG (ref 27–33)
MCHC RBC AUTO-ENTMCNC: 31.5 GM/DL (ref 32–35)
MCV RBC AUTO: 96.8 FL (ref 81–97)
MONOCYTES # BLD AUTO: 0.74 X10(3)/MCL
MONOCYTES NFR BLD AUTO: 7.2 %
NEUTROPHILS # BLD AUTO: 8.07 X10(3)/MCL
NEUTROPHILS NFR BLD AUTO: 78.7 %
PLATELET # BLD AUTO: 289 X10(3)/MCL (ref 151–368)
PMV BLD AUTO: 10 FL
POTASSIUM SERPL-SCNC: 3 MMOL/L (ref 3.5–5.1)
PROT SERPL-MCNC: 5.9 GM/DL (ref 6.4–8.2)
RBC # BLD AUTO: 4.1 X10(6)/MCL (ref 3.9–5)
SODIUM SERPL-SCNC: 141 MMOL/L (ref 136–145)
TSH SERPL-ACNC: 0.55 MIU/ML (ref 0.35–3.75)
WBC # SPEC AUTO: 10.26 X10(3)/MCL (ref 3.4–9.2)

## 2019-01-21 LAB
ABS NEUT (OLG): 7.89
ALBUMIN SERPL-MCNC: 2.8 GM/DL (ref 3.4–5)
ALBUMIN/GLOB SERPL: 0.8 RATIO (ref 1.1–2)
ALP SERPL-CCNC: 85 UNIT/L (ref 46–116)
ALT SERPL-CCNC: 26 UNIT/L (ref 12–78)
AST SERPL-CCNC: 24 UNIT/L (ref 10–37)
BASOPHILS # BLD AUTO: 0.03 X10(3)/MCL
BASOPHILS NFR BLD AUTO: 0.3 %
BILIRUB SERPL-MCNC: 0.7 MG/DL (ref 0.2–1)
BILIRUBIN DIRECT+TOT PNL SERPL-MCNC: 0.19 MG/DL (ref 0–0.2)
BILIRUBIN DIRECT+TOT PNL SERPL-MCNC: 0.51 MG/DL
BUN SERPL-MCNC: 12 MG/DL (ref 7–18)
CALCIUM SERPL-MCNC: 8.9 MG/DL (ref 8.5–10.1)
CHLORIDE SERPL-SCNC: 105 MMOL/L (ref 98–107)
CO2 SERPL-SCNC: 30 MMOL/L (ref 21–32)
CREAT SERPL-MCNC: 0.72 MG/DL (ref 0.55–1.02)
EOSINOPHIL # BLD AUTO: 0.41 X10(3)/MCL
EOSINOPHIL NFR BLD AUTO: 3.9 %
ERYTHROCYTE [DISTWIDTH] IN BLOOD BY AUTOMATED COUNT: 16 %
GLOBULIN SER-MCNC: 3.3 GM/DL (ref 2.4–3.5)
GLUCOSE SERPL-MCNC: 95 MG/DL (ref 74–106)
HCT VFR BLD AUTO: 39.8 % (ref 34–46)
HGB BLD-MCNC: 12.5 GM/DL (ref 11.3–15.4)
IMM GRANULOCYTES # BLD AUTO: 0.05 10*3/UL (ref 0–0.1)
IMM GRANULOCYTES NFR BLD AUTO: 0.5 % (ref 0–1)
LYMPHOCYTES # BLD AUTO: 1.12 X10(3)/MCL
LYMPHOCYTES NFR BLD AUTO: 10.6 %
MAGNESIUM SERPL-MCNC: 1.9 MG/DL (ref 1.8–2.4)
MCH RBC QN AUTO: 30.1 PG (ref 27–33)
MCHC RBC AUTO-ENTMCNC: 31.4 GM/DL (ref 32–35)
MCV RBC AUTO: 95.9 FL (ref 81–97)
MONOCYTES # BLD AUTO: 1.09 X10(3)/MCL
MONOCYTES NFR BLD AUTO: 10.3 %
NEUTROPHILS # BLD AUTO: 7.89 X10(3)/MCL
NEUTROPHILS NFR BLD AUTO: 74.4 %
PLATELET # BLD AUTO: 311 X10(3)/MCL (ref 151–368)
PMV BLD AUTO: 11 FL
POTASSIUM SERPL-SCNC: 3.5 MMOL/L (ref 3.5–5.1)
PROT SERPL-MCNC: 6.1 GM/DL (ref 6.4–8.2)
RBC # BLD AUTO: 4.15 X10(6)/MCL (ref 3.9–5)
SODIUM SERPL-SCNC: 141 MMOL/L (ref 136–145)
WBC # SPEC AUTO: 10.59 X10(3)/MCL (ref 3.4–9.2)

## 2019-01-22 LAB
ALBUMIN SERPL-MCNC: 3.5 GM/DL (ref 3.4–5)
ALBUMIN/GLOB SERPL: 1.1 RATIO (ref 1.1–2)
ALP SERPL-CCNC: 82 UNIT/L (ref 46–116)
ALT SERPL-CCNC: 25 UNIT/L (ref 12–78)
AST SERPL-CCNC: 23 UNIT/L (ref 10–37)
BILIRUB SERPL-MCNC: 1 MG/DL (ref 0.2–1)
BILIRUBIN DIRECT+TOT PNL SERPL-MCNC: 0.26 MG/DL (ref 0–0.2)
BILIRUBIN DIRECT+TOT PNL SERPL-MCNC: 0.74 MG/DL
BUN SERPL-MCNC: 7 MG/DL (ref 7–18)
CALCIUM SERPL-MCNC: 9.4 MG/DL (ref 8.5–10.1)
CHLORIDE SERPL-SCNC: 97 MMOL/L (ref 98–107)
CO2 SERPL-SCNC: 28.6 MMOL/L (ref 21–32)
CREAT SERPL-MCNC: 0.64 MG/DL (ref 0.55–1.02)
GLOBULIN SER-MCNC: 3.2 GM/DL (ref 2.4–3.5)
GLUCOSE SERPL-MCNC: 92 MG/DL (ref 74–106)
POTASSIUM SERPL-SCNC: 3.7 MMOL/L (ref 3.5–5.1)
PROT SERPL-MCNC: 6.7 GM/DL (ref 6.4–8.2)
SODIUM SERPL-SCNC: 135 MMOL/L (ref 136–145)

## 2022-09-13 NOTE — DISCHARGE SUMMARY
Patient:   Kassie Reyna            MRN: [id number]            FIN: [id number]               Age:   82 years     Sex:  Female     :  1935   Associated Diagnoses:   Osteoarthritis; Osteoporosis; RA (rheumatoid arthritis); Displaced spiral fracture of shaft of left femur, initial encounter for closed fracture   Author:   Harsha Lorenzo MD      Results Review   General results   Most recent results   Discrete results only   2018 5:05 CDT       WBC                       11.23 x10(3)/mcL  HI                             RBC                       3.45 x10(6)/mcL  LOW                             Hgb                       10.1 gm/dL  LOW                             Hct                       32.8 %  LOW                             Platelet                  290 x10(3)/mcL                             MCV                       95.1 fL                             MCH                       29.3 pg                             MCHC                      30.8 gm/dL  LOW                             RDW                       18  NA                             MPV                       10  NA                             Abs Neut                  7.78  NA                             Neutro Auto               69.3 %  NA                             Lymph Auto                14.6 %  NA                             Mono Auto                 10.2 %  NA                             Eos Auto                  3.4 %  NA                             Abs Eos                   0.38 x10(3)/mcL  NA                             Basophil Auto             0.5 %  NA                             Abs Neutro                7.78 x10(3)/mcL  NA                             Abs Lymph                 1.64 x10(3)/mcL  NA                             Abs Mono                  1.15 x10(3)/mcL  NA                             Abs Baso                  0.06 x10(3)/mcL  NA                             IG%                       2.0 %  HI                              IG#                       0.2200  HI                             Sodium Lvl                138 mmol/L                             Potassium Lvl             4.2 mmol/L                             Chloride                  103 mmol/L                             CO2                       29.9 mmol/L                             Calcium Lvl               8.2 mg/dL  LOW                             Glucose Lvl               92 mg/dL                             BUN                       26 mg/dL  HI                             Creatinine                0.93 mg/dL                             BUN/Creat Ratio           28  NA                             eGFR-AA                   >60 mL/min/1.73 m2  NA                             eGFR-JAIME                  >60 mL/min/1.73 m2  NA           Discharge Information      Discharge Summary Information   Admitted  5/24/2018   Discharged  6/22/2018   Admitting physician     Saqib Espana MD, Edgar.     Discharge diagnosis     Osteoarthritis (XIM76-WF M19.90).     Osteoporosis (IEI27-JW M81.0).     RA (rheumatoid arthritis) (HTP47-LH M06.9).     Discharge medications     OTHER MEDICATIONS (Selected)   Prescriptions  Prescribed  Lexapro 5 mg oral tablet: 5 mg = 1 tab(s), Oral, Daily, # 30 tab(s), 2 Refill(s), Pharmacy: [facility]  folic acid 1 mg oral tablet: 1 mg = 1 tab(s), Oral, Daily, # 90 tab(s), 3 Refill(s), Pharmacy: [facility]  gabapentin 300 mg oral capsule: 300 mg = 1 cap(s), Oral, BID, # 60 cap(s), 1 Refill(s)  prednisONE 5 mg oral tablet: 2.5 mg = 0.5 tab(s), Oral, Daily, # 40 tab(s), 0 Refill(s), Pharmacy: [facility]  traMADol 50 mg oral tablet: 50 mg = 1 tab(s), Oral, Daily, X 7 day(s), # 30 tab(s), 1 Refill(s)  Documented Medications  Documented  AZELASTINE   SPR 0.1%: 2 spray(s), Both Nostrils, Once a day (at bedtime)  Caltrate 600 + D oral tablet: 1 tab(s), Oral, Daily, # 60 tab(s), 0 Refill(s)  Centrum Silver oral  tablet: 1 tab(s), Oral, Daily, # 30 tab(s), 0 Refill(s)  METHOTREXATE TAB 2.5M mg = 8 tab(s), Oral, qWeek  Pantoprazole 40 mg ORAL EC-Tablet: 40 mg = 1 tab(s), Oral, Daily, # 30 tab(s), 0 Refill(s)  RANITIDINE   TAB 300M mg = 1 tab(s), Oral, qPM  Vitamin B12 500 mcg oral tablet: 500 mcg = 1 tab(s), Oral, Daily, # 30 tab(s), 0 Refill(s)  aspirin 81 mg oral tablet: 81 mg = 1 tab(s), Oral, Daily, # 30 tab(s), 0 Refill(s)  biotin 1000 mcg oral tablet: 1,000 mcg = 1 tab(s), Oral, Daily, # 30 tab(s), 0 Refill(s)  famotidine 40 mg oral tablet: 40 mg = 1 tab(s), Oral, Daily, # 30 tab(s), 0 Refill(s)  oxycodone 5 mg oral tablet: 5 mg = 1 tab(s), Oral, q6hr, PRN PRN breakthru pain, 0 Refill(s).        Physical Examination   General:  Alert and oriented, No acute distress.    Eye:  Pupils are equal, round and reactive to light, Extraocular movements are intact, Normal conjunctiva.    HENT:  Normocephalic, Tympanic membranes are clear.    Neck:  Supple, Non-tender, No carotid bruit, No jugular venous distention, No lymphadenopathy, No thyromegaly.    Respiratory:  Lungs are clear to auscultation, Breath sounds are equal, Symmetrical chest wall expansion, No chest wall tenderness.    Cardiovascular:  Normal rate, Regular rhythm, No murmur, No gallop, No edema.    Gastrointestinal:  Soft, Non-tender, Non-distended, Normal bowel sounds, No organomegaly.    Genitourinary:  No costovertebral angle tenderness.    Vital Signs   2018 12:59 CDT      24 HR Intake Totals       0 mL                             24 HR Output Totals       0 mL                             24 HR I&O Balance         0 mL    2018 10:59 CDT      24 HR Intake Totals       0 mL                             24 HR Output Totals       0 mL                             24 HR I&O Balance         0 mL    2018 8:59 CDT       24 HR Intake Totals       0 mL                             24 HR Output Totals       0 mL                             24 HR  I&O Balance         0 mL    6/22/2018 8:00 CDT       Temperature Oral          36.9 DegC                             Temperature Oral (calculated)             98.42 DegF                             Peripheral Pulse Rate     64 bpm                             Respiratory Rate          20 br/min                             SpO2                      94 %                             Systolic Blood Pressure   117 mmHg                             Diastolic Blood Pressure  70 mmHg                             Mean Arterial Pressure, Cuff              86 mmHg    6/22/2018 0:00 CDT       Temperature Oral          37 DegC                             Temperature Oral (calculated)             98.60 DegF                             Peripheral Pulse Rate     87 bpm                             Respiratory Rate          20 br/min                             SpO2                      94 %                             Oxygen Therapy            Room air                             Systolic Blood Pressure   132 mmHg                             Diastolic Blood Pressure  69 mmHg    6/21/2018 20:59 CDT      24 HR Intake Totals       831 mL                             24 HR Output Totals       0 mL                             24 HR I&O Balance         831 mL    6/21/2018 20:00 CDT      Temperature Oral          36.7 DegC                             Temperature Oral (calculated)             98.06 DegF                             Peripheral Pulse Rate     89 bpm                             Respiratory Rate          20 br/min                             SpO2                      95 %                             Systolic Blood Pressure   121 mmHg                             Diastolic Blood Pressure  70 mmHg                             Mean Arterial Pressure, Cuff              87 mmHg    6/21/2018 16:59 CDT      24 HR Intake Totals       831 mL                             24 HR Output Totals       0 mL                             24 HR I&O  Balance         831 mL    6/21/2018 16:00 CDT      Temperature Oral          36.8 DegC                             Temperature Oral (calculated)             98.24 DegF                             Peripheral Pulse Rate     72 bpm                             Respiratory Rate          20 br/min                             SpO2                      94 %                             Systolic Blood Pressure   132 mmHg                             Diastolic Blood Pressure  74 mmHg                             Mean Arterial Pressure, Cuff              93 mmHg    6/21/2018 8:59 CDT       24 HR Intake Totals       0 mL                             24 HR Output Totals       0 mL                             24 HR I&O Balance         0 mL    6/21/2018 8:00 CDT       Temperature Oral          37.2 DegC                             Temperature Oral (calculated)             98.96 DegF                             Peripheral Pulse Rate     67 bpm                             Respiratory Rate          18 br/min                             SpO2                      97 %                             Systolic Blood Pressure   129 mmHg                             Diastolic Blood Pressure  60 mmHg                             Mean Arterial Pressure, Cuff              83 mmHg    6/21/2018 4:00 CDT       Temperature Oral          36.3 DegC                             Temperature Oral (calculated)             97.34 DegF                             Peripheral Pulse Rate     76 bpm                             Respiratory Rate          20 br/min                             SpO2                      93 %  LOW                             Oxygen Therapy            Room air                             Systolic Blood Pressure   123 mmHg                             Diastolic Blood Pressure  68 mmHg                             Mean Arterial Pressure, Cuff              86 mmHg    6/21/2018 0:59 CDT       24 HR Intake Totals       1,611 mL    6/21/2018 0:00 CDT        Temperature Oral          36.7 DegC                             Temperature Oral (calculated)             98.06 DegF                             Peripheral Pulse Rate     75 bpm                             Respiratory Rate          20 br/min                             SpO2                      94 %                             Oxygen Therapy            Room air                             Systolic Blood Pressure   120 mmHg                             Diastolic Blood Pressure  64 mmHg                             Mean Arterial Pressure, Cuff              83 mmHg        Vital Signs (last 24 hrs)_____  Last Charted___________  Temp Oral     36.9 DegC  (JUN 22 08:00)  Heart Rate Peripheral   64 bpm  (JUN 22 08:00)  Resp Rate         20 br/min  (JUN 22 08:00)  SBP      117 mmHg  (JUN 22 08:00)  DBP      70 mmHg  (JUN 22 08:00)  SpO2      94 %  (JUN 22 08:00)     Lymphatics:  No lymphadenopathy neck, axilla, groin.    Musculoskeletal:  Normal range of motion, Normal strength, No swelling.    Integumentary:  Warm.    Neurologic:  Alert, Oriented, Normal motor function, No focal deficits, Cranial Nerves II-XII are grossly intact, Normal deep tendon reflexes.    Psychiatric:  Cooperative, Non-suicidal.       Hospital Course   Hospital Course   Admitted from: from hospital.     Transferred via: by car.     Admitting diagnosis: Displaced spiral fracture of shaft of left femur, initial encounter for closed fracture (TNT80-FB S72.342A), Osteoarthritis (KIW54-MV M19.90), Osteoporosis (IFG05-ZA M81.0), RA (rheumatoid arthritis) (RFM24-AB M06.9).     Admission disposition: admit to medical bed.       HPI:82-year-old female with significant past medical history of rheumatoid arthritis/osteoporosis . She will be admitted to [facility] [place]  swing bed for PT/OT . She was recently d/c form [facility] after a  ORIF . She had 2 prbc  while in [facility] .  She denie sany complaint at this time .           Hospital course: 81 y/o admitted with a dx of left  femural fx  s/p ORIF . She was admitted for PT/OT .  Pt did not have nay  acute event since admisison except for left leg pain whcih improved with pain medication . Pt was seen and examine laura bedside . She was detemined to be suitable for d/c         D/C summary time : 35 min       Discharge Plan   Discharge Summary Plan   Discharge Status: improved.     Discharge instructions given: to patient.     Discharge disposition: discharge to home (into the care of family member, with home health care).     Prescriptions: continue same medications, reviewed (with patient, with caregiver), written and given to patient.     Education and Follow-up   Counseled: patient, family, regarding diagnosis, regarding treatment, regarding medications.     Discharge Planning: Osteoporosis, Easy-to-Read, Cardiac Diet (Custom), Femoral Shaft Fracture, Femur Fracture ORIF - SNF/Rehab Discharge GENEVA Updated 10/14/16 (Custom), Arthritis, Easy-to-Read, Adrian Foote July 9, 2018  at 1:00 p.m.; Mat May In 1 week; Anytime the conditions worsen, return to clinic or go to ED; Report to Emergency Department if symptoms return or worsen; Harsha Espana.

## 2022-09-13 NOTE — H&P
Patient:   Kassie Reyna            MRN: [id number]            FIN: [id number]               Age:   82 years     Sex:  Female     :  1935   Associated Diagnoses:   None   Author:   Kaleigh Blandon MD      Basic Information   Admit information:  Consulted from ED for left femoral fracture .    Time Seen:  Date & Time 2018 18:20:00.    Source of history:  Self, Family member.    Present at bedside:  Family member.    Referral source:  Emergency department.    History limitation:  None.    Advance directive:  Full code.    Provider information/ cc:  Primary care:  Mat May MD       Chief Complaint   Left hip pain      History of Present Illness      82-year-old female with significant past medical history as noted below.  She was in her usual state of health until today afternoon.  Patient reports that today afternoon while she was cleaning up her dryer and she was bending over, she felt dizzy and fell backwards hitting the refrigerator behind her.  After the incident she could not stand up by herself and was having a lot of left hip pain.  Patient was brought to the ED by family members.  Upon initial evaluation patient is hemodynamically stable except for mildly elevated blood pressure.  Labs remarkable for very mild acute renal insufficiency.  Otherwise unremarkable.  Plain x-ray revealed left femoral fracture.  Orthopedics consulted and patient was admitted to hospitalist service      Review of Systems   Except as documented, all other systems reviewed and negative      Health Status   Current medications:  (Selected)   Inpatient Medications  Ordered  Lexapro 10 mg oral tablet: 5 mg, form: Tab, Oral, Daily, first dose 18 9:00:00 CDT  Ofirmev: 1,000 mg, form: Infusion, IV Piggyback, q6hr PRN for breakthru pain, Infuse over: 15 minute(s), Order duration: 24 hr, first dose 18 16:11:00 CDT, stop date 18 16:10:00 CDT  Pantoprazole 40 mg ORAL EC-Tablet: 40 mg, form:  Tab-EC, Oral, Daily, first dose 05/21/18 6:00:00 CDT  Percocet 5/325: 1 tab(s), form: Tab, Oral, q6hr PRN for pain, first dose 05/20/18 16:11:00 CDT, mild/moderate  Sodium Chloride 0.9% intravenous solution 1,000 mL: 1,000 mL, 1,000 mL, IV, 75 mL/hr, start date 05/20/18 18:36:00 CDT  Zofran: 4 mg, form: Injection, IV Push, q4hr PRN for nausea, first dose 05/20/18 16:11:00 CDT, choose first if ordered with other treatments for nausea  azelastine 137 mcg/inh (0.1%) nasal spray: 2 spray(s), 274 mcg =, form: Spray, Both Nostrils, BID, first dose 05/20/18 21:00:00 CDT  famotidine: 40 mg, form: Tab, Oral, Daily, first dose 05/21/18 9:00:00 CDT  folic acid 1 mg oral tablet: 1 mg, form: Tab, Oral, Daily, first dose 05/21/18 9:00:00 CDT  labetalol: 20 mg, form: Soln, IV Push, q2hr PRN for hypertension, first dose 05/20/18 16:11:00 CDT, SBP > _170___ and/or DBP > _100___ not to exceed 300mg/24hrs  morphine: 4 mg, form: Injection, IV, q2hr PRN for pain, first dose 05/20/18 16:11:00 CDT, severe OR mild/moderate if NPO  prednisONE 5 mg oral tablet: 2.5 mg, form: Tab, Oral, Daily, first dose 05/21/18 9:00:00 CDT  traMADol: 50 mg, form: Tab, Oral, Daily, first dose 05/21/18 9:00:00 CDT  Prescriptions  Prescribed  Lexapro 5 mg oral tablet: 5 mg = 1 tab(s), Oral, Daily, # 30 tab(s), 2 Refill(s), Pharmacy: [facility]  albuterol CFC free 90 mcg/inh inhalation aerosol with adapter: 2 puff(s), INH, QID, PRN PRN for wheezing, # 1 EA, 0 Refill(s), Pharmacy: [facility]  folic acid 1 mg oral tablet: 1 mg = 1 tab(s), Oral, Daily, # 90 tab(s), 3 Refill(s), Pharmacy: [facility]  meloxicam 7.5 mg oral tablet: 7.5 mg = 1 tab(s), Oral, Daily, take daily for shoulder pain, # 30 tab(s), 0 Refill(s), Pharmacy: [facility]  methotrexate 5 mg oral tablet: 20 mg = 4 tab(s), Oral, qWeek, X 90 day(s), # 52 tab(s), 3 Refill(s), Pharmacy: [facility]  prednisONE 5 mg oral tablet: 2.5 mg = 0.5 tab(s), Oral,  Daily, # 40 tab(s), 0 Refill(s), Pharmacy: [facility]  Documented Medications  Documented  AZELASTINE   SPR 0.1%: 2 spray(s), Both Nostrils, BID  AZITHROMYCIN TAB 250MG: Oral, As Directed  BENZONATATE  CAP 200M mg = 1 cap(s), Oral, TID  Caltrate 600 + D oral tablet: 1 tab(s), Oral, Daily, # 60 tab(s), 0 Refill(s)  Centrum Silver oral tablet: 1 tab(s), Oral, Daily, # 30 tab(s), 0 Refill(s)  METHOTREXATE TAB 2.5M mg = 8 tab(s), Oral, qWeek  Ocuvite Plus oral tablet: Daily, 0 Refill(s)  Pantoprazole 40 mg ORAL EC-Tablet: 40 mg = 1 tab(s), Oral, Daily, # 30 tab(s), 0 Refill(s)  RANITIDINE   TAB 300M mg = 1 tab(s), Oral, qPM  TRAMADOL HCL TAB 50M mg = 1 tab(s), Oral, Daily  Vitamin B12 500 mcg oral tablet: 500 mcg = 1 tab(s), Oral, Daily, # 30 tab(s), 0 Refill(s)  aspirin 81 mg oral tablet: 81 mg = 1 tab(s), Oral, Daily, # 30 tab(s), 0 Refill(s)  biotin 1000 mcg oral tablet: 1,000 mcg = 1 tab(s), Oral, Daily, # 30 tab(s), 0 Refill(s)  famotidine 40 mg oral tablet: 40 mg = 1 tab(s), Oral, Daily, # 30 tab(s), 0 Refill(s)      Histories     PMH: RA, osteoporosis  PSH: Right lateral cataract surgery, wrist fracture repair  Family Hx: Reviewed and Nonpertinent  Social Hx:  No alcohol, tobacco or illicit drug use.       Physical Examination      Vital Signs (last 24 hrs)_____  Last Charted___________  Temp Oral     36.5 DegC  (MAY 20 16:15)  Heart Rate Peripheral   72 bpm  (MAY 20 16:15)  Resp Rate         15 br/min  (MAY 20 15:30)  SBP      H 152mmHg  (MAY 20 16:15)  DBP      75 mmHg  (MAY 20 16:15)  SpO2      98 %  (MAY 20 16:15)  Weight      69.8 kg  (MAY 20 16:16)  Height      165 cm  (MAY 20 16:16)  BMI      25.64  (MAY 20 16:)     General:  Alert and oriented, No acute distress.    Cognition and Speech:  Oriented, Speech clear and coherent.    HENT:  Normocephalic, Normal hearing.    Eye:  Pupils are equal, round and reactive to light, Normal conjunctiva.    Neck:  Supple.     Respiratory:  Lungs are clear to auscultation, Respirations are non-labored, Breath sounds are equal.    Cardiovascular:  Normal rate, Regular rhythm, No murmur, No edema.    Gastrointestinal:  Soft, Non-tender, Non-distended, Normal bowel sounds.    Integumentary:  Warm, Intact.    Musculoskeletal:  L Hip is immobilized, tenderness to palpation around the joint.    Neurologic:  Alert, Oriented, Normal sensory, No focal deficits.    Psychiatric:  Cooperative, Appropriate mood & affect, Normal judgment.       Review / Management   Labs Last 24 Hours   Chemistry  Hematology/Coagulation    Sodium Lvl: 141 mmol/L (05/20/18 14:27:00 CDT) PT: 12.7 second(s) (05/20/18 14:27:00 CDT)   Potassium Lvl: 4.3 mmol/L (05/20/18 14:27:00 CDT) INR: 0.93 (05/20/18 14:27:00 CDT)   Chloride: 106 mmol/L (05/20/18 14:27:00 CDT) PTT: 20.1 second(s) Low (05/20/18 14:27:00 CDT)   CO2: 27 mmol/L (05/20/18 14:27:00 CDT) WBC: 8.9 x10(3)/mcL (05/20/18 14:27:00 CDT)   Calcium Lvl: 8.2 mg/dL Low (05/20/18 14:27:00 CDT) RBC: 3.67 x10(6)/mcL Low (05/20/18 14:27:00 CDT)   Glucose Lvl: 137 mg/dL High (05/20/18 14:27:00 CDT) Hgb: 11.4 gm/dL Low (05/20/18 14:27:00 CDT)   BUN: 19 mg/dL High (05/20/18 14:27:00 CDT) Hct: 35.9 % Low (05/20/18 14:27:00 CDT)   Creatinine: 1.11 mg/dL High (05/20/18 14:27:00 CDT) Platelet: 232 x10(3)/mcL (05/20/18 14:27:00 CDT)   eGFR-AA: >60 (05/20/18 14:27:00 CDT) MCV: 97.8 fL High (05/20/18 14:27:00 CDT)   eGFR-JAIME: 50 mL/min/1.73 m2 (05/20/18 14:27:00 CDT) MCH: 31.1 pg High (05/20/18 14:27:00 CDT)   Bili Total: 0.3 mg/dL (05/20/18 14:27:00 CDT) MCHC: 31.8 gm/dL Low (05/20/18 14:27:00 CDT)   Bili Direct: 0.1 mg/dL (05/20/18 14:27:00 CDT) RDW: 16.4 % (05/20/18 14:27:00 CDT)   Bili Indirect: 0.2 mg/dL (05/20/18 14:27:00 CDT) MPV: 10.4 fL (05/20/18 14:27:00 CDT)   AST: 50 unit/L High (05/20/18 14:27:00 CDT) Abs Neut: 7.2 x10(3)/mcL (05/20/18 14:27:00 CDT)   ALT: 38 unit/L (05/20/18 14:27:00 CDT) Neutro Auto: 80 % (05/20/18  14:27:00 CDT)   Alk Phos: 65 unit/L (05/20/18 14:27:00 CDT) Lymph Auto: 10 % (05/20/18 14:27:00 CDT)   Total Protein: 6.1 gm/dL Low (05/20/18 14:27:00 CDT) Mono Auto: 6 % (05/20/18 14:27:00 CDT)   Albumin Lvl: 3.4 gm/dL (05/20/18 14:27:00 CDT) Eos Auto: 2 % (05/20/18 14:27:00 CDT)   Globulin: 2.7 gm/dL (05/20/18 14:27:00 CDT) Abs Eos: 0.2 x10(3)/mcL (05/20/18 14:27:00 CDT)   A/G Ratio: 1.3 (05/20/18 14:27:00 CDT) Basophil Auto: 0 % (05/20/18 14:27:00 CDT)    Abs Neutro: 7.2 x10(3)/mcL (05/20/18 14:27:00 CDT)    Abs Lymph: 0.9 x10(3)/mcL (05/20/18 14:27:00 CDT)    Abs Mono: 0.6 x10(3)/mcL (05/20/18 14:27:00 CDT)    Abs Baso: 0 x10(3)/mcL (05/20/18 14:27:00 CDT)     XR Hip     There is an acute displaced left femoral intertrochanteric and  subtrochanteric fracture. There is no dislocation.         Impression and Plan     Left femoral fracture  Orthopedics consulted  Possibly to the OR tomorrow  Nothing by mouth past midnight  Pain control with when necessary Norco, morphine    Elevated blood pressure  No history of hypertension  Likely secondary to pain  When necessary labetalol and adequate pain control    Acute kidney injury-mild  IV fluids-normal saline at 75 mL per hour  Avoid nephrotoxic drugs    History of rheumatoid arthritis  Patient is on low-dose prednisone  Hold methotrexate    Prophylaxis  DVT - bilateral SCDs, Lovenox versus aspirin to be initiated after surgery

## 2022-09-13 NOTE — ED PROVIDER NOTES
Patient:   Kassie Reyna            MRN: [id number]            FIN: [id number]               Age:   81 years     Sex:  Female     :  1935   Associated Diagnoses:   Head contusion; Closed displaced spiral fracture of shaft of right humerus   Author:   Graham Mcarthur MD      Basic Information   Time seen: Date & time 2017 11:31:00.   History source: Patient.   Arrival mode: Private vehicle.   History limitation: None.   Additional information: Chief Complaint from Nursing Triage Note : Chief Complaint   2017 11:12 CDT       Chief Complaint           fell tripped in yard hit right side of head on brick wall, right arm and shouler numbness 15 minutes pta  .      History of Present Illness   The onset was 15  minutes ago.  Type of injury: fall and direct blow.  The character of symptoms is pain and swelling.  Loss of consciousness none.  Location: Right temporal. The course/duration of symptoms is fluctuating in intensity.  The location where the incident occurred was at home.  Risk factors consist of none.  Prior episodes: none.  Therapy today: none.  Associated symptoms: altered coordination.  Associated injury none.  Additional history: none.        Review of Systems   Constitutional symptoms:  No fever, no weakness.    Skin symptoms:  Negative except as documented in HPI.   Eye symptoms:  Negative except as documented in HPI.   ENMT symptoms:  No sore throat,    Respiratory symptoms:  No shortness of breath,    Cardiovascular symptoms:  No chest pain,    Gastrointestinal symptoms:  No nausea, no vomiting.    Musculoskeletal symptoms:  Reports: Right, upper arm, acute, pain, swelling, decreased range of motion.   Neurologic symptoms:  Headache.   Psychiatric symptoms:  No anxiety,    Endocrine symptoms:  Negative except as documented in HPI.             Additional review of systems information: All systems reviewed as documented in chart.      Health Status   Allergies:     Allergic Reactions (Selected)  No Known Allergies,    Allergies (1) Active Reaction  No Known Allergies None Documented  .   Medications:  (Selected)   Prescriptions  Prescribed  Evista 60 mg oral tablet: 60 mg = 1 tab(s), Oral, Daily, # 30 tab(s), 6 Refill(s), Pharmacy: [id number]  codeine-promethazine 10 mg-6.25 mg/5 mL oral syrup: 5 mL, Oral, q6hr, # 120 mL, 1 Refill(s)  meloxicam 15 mg oral tablet: 15 mg = 1 tab(s), Oral, Daily, # 30 tab(s), 3 Refill(s), Pharmacy: [facility]  Documented Medications  Documented  Caltrate: = 2 tab(s), Daily, 0 Refill(s)  Centrum Silver oral tablet: 1 tab(s), Oral, Daily, # 30 tab(s), 0 Refill(s)  FOLIC ACID 1 MG TABLET: 1 mg = 1 tab(s), Oral, Daily  METHOTREXATE 2.5MG: 15 mg = 6 tab(s), Oral, qWeek  Ocuvite Plus oral tablet: Daily, 0 Refill(s)  PANTOPRAZOLE SODIUM 40 M mg = 1 tab(s), Oral, Daily  TRAMADOL HCL 50MG:   Vitamin B-12 500 mcg oral tablet: 500 mcg = 1 tab(s), Oral, TID, # 30 tab(s), 0 Refill(s)  aspirin 81 mg oral tablet: 81 mg = 1 tab(s), Oral, Daily, # 30 tab(s), 0 Refill(s)  famotidine 40 mg oral tablet: 40 mg = 1 tab(s), Oral, Daily, # 30 tab(s), 0 Refill(s).      Past Medical/ Family/ Social History   Medical history:    Active  Carotid stenosis (129462470): Onset in  at 78 years.  Arthritis, rheumatoid ([id number])  Resolved  Able to lie down (138430168):  Resolved.  Arthritis (1853258):  Resolved.  Cataracts ([id number]):  Resolved.  Exercise tolerance (328542037):  Resolved.  GERD (gastroesophageal reflux disease) ([id number]):  Resolved.  Osteoporosis ([id number]):  Resolved.  Wears glasses ([id number]):  Resolved..   Family history:    Cancer  Brother  CAD (coronary artery disease)  Father  Hypertension.  Father  .   Social history: Alcohol use: Denies, Tobacco use: Denies, Drug use: Denies, Occupation: Retired, Family/social situation: .    Problem list:    Active Problems (6)  Arthritis, rheumatoid   Carotid stenosis   GERD - Gastro-esophageal reflux disease   Osteoarthritis   Osteoporosis   Thoracic compression fracture   .      Physical Examination               Vital Signs      Vital Signs (last 24 hrs)_____  Last Charted___________  Temp Oral     36.5 DegC  (MAY 05 11:12)  Heart Rate Peripheral   74 bpm  (MAY 05 11:12)  Resp Rate         17 br/min  (MAY 05 11:12)  SBP      H 162mmHg  (MAY 05 11:12)  DBP      66 mmHg  (MAY 05 11:12)  SpO2      99 %  (MAY 05 11:12)  .   Basic Oxygen Information   5/5/2017 11:12 CDT       SpO2                      99 %                             Oxygen Therapy            Room air  .   General:  Alert, no acute distress.    Skin:  Warm, pink.    Head:  Normocephalic.   Neck:  Supple.   Eye:  Pupils are equal, round and reactive to light.   Ears, nose, mouth and throat:  Tympanic membranes clear.   Cardiovascular:  Regular rate and rhythm, No murmur, Normal peripheral perfusion.    Respiratory:  Lungs are clear to auscultation.   Gastrointestinal:  Soft, Nontender, Normal bowel sounds.    Genitourinary:  No tenderness.   Back:  Nontender.   Musculoskeletal:  Proximal upper extremity: Right, arm, humerus, swelling, range of motion (limited, restricted by pain).   Neurological:  Alert and oriented to person, place, time, and situation, CN II-XII intact, normal sensory observed, Speech: Normal, Garrett coma scale: Total score 15.    Lymphatics:  No lymphadenopathy.   Psychiatric:  Cooperative, appropriate mood & affect.       Medical Decision Making   Electrocardiogram:  Time 5/5/2017 12:39:00, rate 75, normal sinus rhythm, No ST-T changes, no ectopy, normal MN & QRS intervals, EP Interp.       Impression and Plan   Diagnosis   Head contusion (GCE77-DW S00.93XA)   Closed displaced spiral fracture of shaft of right humerus (HMQ54-KT S42.341A)   Plan   Condition: Improved.

## 2022-09-13 NOTE — HISTORICAL [facility] CERNER
This is a historical note converted from [place]. Formatting and pictures may have been removed.  Please reference [place] for original formatting and attached multimedia. Chief Complaint  Shes so weak and she wont eat  History of Present Illness  83-year-old female with past medical history of?dementia, osteoporosis, rheumatoid arthritis?presents to the ED for increasing weakness.??Symptoms have been ongoing for the last week.? At baseline she could ambulate with a walker but now she can barely get around the home without assistance.? Associated with decreased p.o. intake and new onset RUE tremor. ?Family denies any recent?stressors. ?Patient denies any nausea,?vomiting, dysuria, cough or congestion.??In the ED her labs showed her being hypokalemic and mild BONNIE. ?Her CT of the head was negative, CT of the?lumbosacral spine was negative for acute fracture,?CT the abdomen without contrast?showed diverticulosis in the?colon with stool?and?hepatic steatosis. ?Chest x-ray was negative.??UA was negative for WBCs or leukocyte esterase. ?[facility] [place] for failure to thrive and generalized weakness.  Review of Systems  CONSTITUTIONAL:?+ weight loss, No fever, no chills,?+ weakness, no fatigue.  EYES: No diplopia, no blurred vision.  ENT: No earache, no sore throat, no runny nose.  CARDIOVASCULAR:? No pressure, no squeezing, no strangling, no tightness, no heaviness, no aching about the chest, neck, axilla or epigastrium.  RESPIRATORY: No cough, no shortness of breath, no PND, no orthopnea.  GASTROINTESTINAL:? No nausea, no vomiting, no diarrhea.  GENITOURINARY: No dysuria, no urinary frequency, no urgency, no dribbling.  MUSCULOSKELETAL: No muscle ache, no back pain, no joint pain, no joint stiffness.  SKIN:? No change in skin, no change in hair, no change in nails.  NEUROLOGIC:? No paresthesias, no fasciculations, no seizures, no weakness.  PSYCHIATRIC:? No disorder of thought or mood.  ENDOCRINE:? No heat  or cold intolerance, no polyuria, no polydipsia.  HEMATOLOGICAL:? No easy bruising, no easy bleeding.  Physical Exam  Vitals & Measurements  T:?36.6? ?C (Oral)? TMIN:?36.6? ?C (Oral)? TMAX:?36.8? ?C (Oral)? HR:?66(Peripheral)? RR:?18? BP:?168/70? SpO2:?97%? WT:?54.2?kg? WT:?54.2?kg?  General: ?Alert and oriented, in no acute distress. ?  ????????????????? Appearance: Thin, frail  ????????????????? Behavior: Appropriate.?  ????????????????? Skin: Normal for ethnicity.?  ?  Eye:? No scleral icterus, Extraocular movements are intact.?  ?????  HENT:? Normocephalic, Normal hearing, Oral mucosa is mildly dry, No pharyngeal erythema.  ?????  Neck:? Supple, Non-tender, No carotid bruit, No jugular venous distention, No lymphadenopathy, No thyromegaly.?  ?????  Respiratory:? Symmetrical chest rise and fall, Clear to auscultation bilaterally, No chest wall tenderness.?  ?????  Cardiovascular:? Normal rate, Regular rhythm, No murmur, No gallop, Good pulses equal in all extremities, Normal peripheral perfusion, No edema.?  ?????  Gastrointestinal:? Soft, left lower quadrant tenderness palpation, Non-distended, Normal bowel sounds, No organomegaly.?  ?????  Genitourinary:? No costovertebral angle tenderness, No urethral discharge.?  ???  Lymphatics:? No lymphadenopathy neck, axilla, groin.?  ?????  Musculoskeletal:? Normal range of motion, generalized weakness?observed, No tenderness, No swelling.?  ?????  Integumentary:? Warm, Dry, Pink, Intact, No rash.?  ?????  Neurologic:? Alert, Oriented, Normal sensory,?strength?3/5 in all 4 extremities, right upper quadrant?resting tremor noted, Cranial Nerves II-XII are grossly intact.?  ?????  Psychiatric:? Cooperative, mood depressed, affect flat, Normal judgment.?  Assessment/Plan  1.?Adult failure to thrive?R62.7  Differential includes worsening dementia?versus CVA vs GI dysmotility?secondary to her autoimmune history.  2.?Left lower quadrant abdominal  tenderness?R10.814  3.?Hypokalemia?E87.6  4.?Osteoarthritis?M19.90  5.?RA (rheumatoid arthritis)?M06.9  6.?Dementia?F03.90  Orders:  aspirin, 81 mg, form: Tab-Chew, Oral, Daily, first dose 01/20/19 9:00:00 CST  calcium-vitamin D, 1 tab(s), form: Tab, Oral, Daily, first dose 01/20/19 7:30:00 CST  donepezil, 10 mg, form: Tab, Oral, Once a day (at bedtime), first dose 01/20/19 21:00:00 CST  folic acid, 1 mg, form: Tab, Oral, Daily, first dose 01/20/19 9:00:00 CST  hydrOXYzine, 50 mg, form: Cap, Oral, Daily, first dose 01/20/19 9:00:00 CST  potassium chloride, 40 mEq, form: Liquid, Oral, PC BID, first dose 01/20/19 9:00:00 CST, Routine, x2 doses  Sodium Chloride 0.9% intravenous solution 1,000 mL + multivitamin 10 mL Daily + thiamine 100 mg Nalini, 1,011.2 mL, 1,000 mL, IV, 90 mL/hr, start date 01/20/19 9:10:00 CST, Banana Bag  Sodium Chloride 0.9% intravenous solution 1,000 mL + potassium chloride 40 mEq, 1,020 mL, 1,000 mL, IV, 90 mL/hr, start date 01/20/19 9:16:00 CST  Template Non-Formulary Med, 1 tab, form: Dose, Oral, Daily, first dose 01/20/19 9:00:00 CST  CBC w/ Auto Diff, Routine collect, 01/21/19 5:00:00 CST, Blood, Stop date 01/21/19 5:00:00 CST, Lab Collect, 01/21/19 5:00:00 CST  Comprehensive Metabolic Panel, Routine collect, 01/21/19 5:00:00 CST, Blood, Stop date 01/21/19 5:00:00 CST, Lab Collect, in AM, 01/21/19 5:00:00 CST  Consult to Nutritionist Adult, 01/19/19 17:23:00 CST, failure to thive  CT Abdomen and Pelvis W W/O Contrast, Routine, 01/20/19 5:00:00 CST, Abdominal Pain, None, Stretcher, Patient Has IV?, with oral and IV contrast, Rad Type, Oral Contrast, 01/20/19 5:00:00 CST  Magnesium Level, Routine collect, 01/21/19 5:00:00 CST, Blood, Stop date 01/21/19 5:00:00 CST, Lab Collect, 01/21/19 5:00:00 CST  Occupational Therapy Eval and Treat, 01/19/19 17:23:00 CST, ADL, Stop date 01/19/19 17:23:00 CST, Stretcher, Patient has IV, Standard Precautions  Physical Therapy Eval and Treat, 01/19/19 17:22:00  CST, Mobility, Patient has IV, Standard Precautions  Speech Language Pathology Eval and Treat, 01/19/19 14:21:00 CST, Bedside Swallow Eval, Evaluation and Treatment, Stretcher, Patient has IV, Standard Precautions  Thyroid Stimulating Hormone, Now collect, 01/20/19 9:10:00 CST, Blood, Stop date 01/20/19 9:11:00 CST, Lab Collect, 01/20/19 9:10:00 CST  ?  Admit as observation  PT, OT, and speech therapy consult  CT of the abdomen?and pelvis?with contrast  Check TSH  Trend labs as warranted and replete electrolytes  GI/DVT ppx as warranted  Consult case management?to assist with discharge planning?pending workup  Will review and restart home medications as warranted to ensure that chronic medical problems remained stable during admission   Problem List/Past Medical History  Ongoing  Carotid stenosis  Closed displaced spiral fracture of shaft of left femur  GERD - Gastro-esophageal reflux disease  Obesity  Osteoarthritis  Osteoporosis  RA (rheumatoid arthritis)  Right lumbosacral radiculopathy  Historical  Able to lie down  Arthritis  Cataracts  Exercise tolerance  GERD (gastroesophageal reflux disease)  Osteoporosis  Wears glasses  Procedure/Surgical History  Intramedullary Robby Insertion Femur (Left) (05/21/2018)  03588 - LT CATARACT W/IOL 1 STA PHACO (Left) (05/03/2016)  84637 - RT CATARACT W/IOL 1 STA PHACO (Right) (04/05/2016)  left wrist and right clavicle fracture repair (09/10/2015)  removal of freckle from left elbow  Thumb   Medications  Inpatient  aspirin 81 mg oral tablet, CHEWABLE, 81 mg= 1 tab(s), Oral, Daily  calcium (as carbonate)-vitamin D 600 mg-400 intl units oral tablet, 1 tab(s), Oral, Daily  donepezil 5 mg oral tablet, 10 mg= 2 tab(s), Oral, Once a day (at bedtime)  folic acid 1 mg oral tablet, 1 mg= 1 tab(s), Oral, Daily  hydrOXYzine pamoate 50 mg oral capsule, 50 mg= 1 cap(s), Oral, Daily  [id number] 1,000 mL + Multivitamin (MVI) additive 10 mL Daily + thiamine additive for infusion 100 mg  D  potassium chloride (KCl) ORAL liquid, 40 mEq= 30 mL, Oral, PC BID  Sodium Chloride 0.9% 1000mL 1,000 mL + potassium chloride (additive for CRI) 40 mEq  Toviaz 4 mg oral tablet, extended release, 1 tab, Oral, Daily  Home  aspirin 81 mg oral tablet, 81 mg= 1 tab(s), Oral, Daily  Caltrate 600 + D oral tablet, 1 tab(s), Oral, Daily  Centrum Silver oral tablet, 1 tab(s), Oral, Daily  donepezil 10 mg oral tablet, 10 mg= 1 tab(s), Oral, Once a day (at bedtime), 1 refills  famotidine 40 mg oral tablet, 40 mg= 1 tab(s), Oral, qAM, 3 refills  folic acid 1 mg oral tablet, 1 mg= 1 tab(s), Oral, Daily, 3 refills  HYDROXYZ MALLORY CAP 25MG, 25 mg= 1 cap(s), Oral, Daily,? ?Not taking: just filled, hasnt had first dose.  hydrOXYzine hydrochloride, 50 mg, Oral, Daily  methotrexate 2.5 mg oral tablet, 15 mg= 6 tab(s), Oral, qWeek  METHOTREXATE TAB 2.5MG, 20 mg= 8 tab(s), Oral, qWeek,? ?Not Taking per Prescriber  Ocuvite Extra oral tablet, 1 tab(s), Oral, Daily  Ocuvite oral tablet, 1 tab(s), Oral, Daily  OXYBUTYNIN TAB 5MG ER, 5 mg= 1 tab(s), Oral, Daily,? ?Not Taking per Prescriber  Toviaz 4 mg oral tablet, extended release, 4 mg= 1 tab(s), Oral, Daily  traMADol 50 mg oral tablet, 50 mg= 1 tab(s), Oral, BID  TRAMADOL HCL TAB 50MG,? ?Not Taking per Prescriber  Allergies  No Known Allergies  Social History  Alcohol - Denies Alcohol Use, 09/17/2013  Never, 03/11/2016  Employment/School  Retired, Work/School description: [employer] ., 08/02/2018  Exercise  Exercise duration: 0., 02/01/2018  Home/Environment  Lives with Spouse., 04/26/2016  Nutrition/Health  Regular, 02/01/2018  Sexual  Sexually active: No. Sexual orientation: Straight or heterosexual. Gender Identity Identifies as female., 01/19/2019  Substance Abuse - Denies Substance Abuse, 09/17/2013  Never, 03/11/2016  Tobacco - Denies Tobacco Use, 09/17/2013  Never (less than 100 in lifetime), No, 01/20/2019  Never (less than 100 in lifetime), No, 01/19/2019  Never  (less than 100 in lifetime), N/A, Smokeless Tobacco Use: Never., 01/14/2019  Family History  CAD (coronary artery disease): Father.  Cancer: Brother.  Hypertension.: Father.  Stroke: Mother and Father.  Lab Results  Test Name Test Result Date/Time Comments   Sodium Lvl 136 mmol/L 01/19/2019 08:39 CST    Potassium Lvl 3.3 mmol/L (Low) 01/19/2019 08:39 CST    Chloride 97 mmol/L (Low) 01/19/2019 08:39 CST    CO2 32.1 mmol/L (High) 01/19/2019 08:39 CST    Glucose Lvl 102 mg/dL 01/19/2019 08:39 CST    BUN 23 mg/dL (High) 01/19/2019 08:39 CST    Creatinine 1.07 mg/dL (High) 01/19/2019 08:39 CST    Albumin Lvl 3.3 gm/dL (Low) 01/14/2019 11:50 CST    Total CK 38 mg/dL 01/19/2019 08:39 CST    CK MB 0.5 ng/mL 01/19/2019 08:39 CST A cutoff of 0.6 - 1.5 ng/ml for Troponin I is suggested as being consistent with the WHO criteria for AMI.   Troponin-I 0.017 ng/mL 01/19/2019 08:39 CST 0.5 ng/mL is the accepted discriminant level for mycardial injury rather than a statistical normal limit for the general population.   INR 1.10 01/19/2019 08:39 CST    WBC 13.70 x10(3)/mcL (High) 01/19/2019 08:39 CST    Hgb 13.3 gm/dL 01/19/2019 08:39 CST    Hct 41.5 % 01/19/2019 08:39 CST    Platelet 307 x10(3)/mcL 01/19/2019 08:39 CST    UA Nitrite Negative UA 01/19/2019 09:35 CST    UA Leuk Est Negative UA 01/19/2019 09:35 CST    UA Bacteria Few (Abnormal) 01/19/2019 09:35 CST    Diagnostic Results  (01/19/2019 10:20 CST CT Lumbar Spine W/O Contrast)  Reason For Exam  Spinal Injury  ?  Radiology Report  EXAMINATION: CT lumbar spine without contrast  ?  EXAMINATION DATE: 1/19/2019  ?  COMPARISON: CT the abdomen pelvis from same day  ?  TECHNIQUE: Multiple cross-sectional images of the lumbar spine were  obtained without the use of intravenous contrast. Coronal and sagittal  reformatted images were obtained.  ?  CLINICAL HISTORY: Fall  ?  FINDINGS:  ?  5 lumbar type vertebral bodies. Diffuse osteopenia is noted. The  vertebral body heights are  maintained. Intervertebral disc space  narrowing at L5-S1 with vacuum disc phenomena. No definitive  compression deformity, fracture, or subluxation.  ?  T12-L1: No significant abnormality.  L1-L2: Ligament flavum hypertrophy and degenerative changes of facets.  No central canal stenosis. No neural foraminal narrowing.  L2-L3: Subtle transverse spondylotic ridge with ligament flavum  hypertrophy and degenerative changes of facets. No central canal  stenosis or neural foraminal narrowing.  L3-L4: Ligament flavum hypertrophy and degenerative changes of facets.  No central canal stenosis or neural foraminal narrowing.  L4-L5: Ligament flavum hypertrophy and degenerative changes of facets.  No central canal stenosis or neural foraminal narrowing.  L5-S1: Ligament flavum hypertrophy and degenerative changes of facets.  No central canal stenosis or neural foraminal narrowing.  ?  Degenerative changes of the bilateral SI joints.  ?  The visualized hollow and solid viscera of the abdomen and pelvis are  normal. Calcified atheromatous disease of the abdominal aorta. Fatty  atrophy of the paraspinal musculature.  ?  IMPRESSION:?  ?  Multilevel spondylotic changes of the lumbar spine as described above.  No fracture.  ? [1]  ?  (01/19/2019 10:20 CST CT Head W/O Contrast)  Reason For Exam  Head Injury  ?  Radiology Report  ?  Clinical History:  Fall  ?  Reference:  5 May 2017  ?  Technique:  CT imaging of the head performed from the skull base to the vertex  without intravenous contrast. DLP 1332 mGycm. Automatic exposure  control, adjustment of mA/kV or iterative reconstruction technique was  used to reduce radiation.  ?  Findings:  ?  No acute cortical infarct, hemorrhage or mass lesion. Patchy  hypoattenuation in the cerebral white matter perhaps slightly  progressed compared to 2017. There is moderate generalized atrophy.  ?  Visualized paranasal sinuses and mastoid air cells are clear.  ?  Impression:  No acute  intracranial findings.  ? [2]  ?  (01/19/2019 10:20 CST CT Abdomen and Pelvis W/O Contrast)  Reason For Exam  Follow Up Trauma  ?  Radiology Report  EXAMINATION: CT the abdomen pelvis without contrast  ?  EXAMINATION DATE: 1/19/2019  ?  COMPARISON: None  ?  TECHNIQUE: Multiple cross-sectional images of the abdomen and pelvis  were obtained without the use of intravenous contrast. Coronal and  sagittal reformatted images were obtained.  ?  CLINICAL HISTORY: Fall  ?  FINDINGS:  ?  Dependent atelectatic changes of the lungs. The heart size is  enlarged. Coronary artery calcifications are identified.  ?  Evaluation hollow and solid viscera is limited secondary to technique.  ?  The liver is diffusely hypodense consistent with hepatic steatosis.  The gallbladder is present. The spleen, adrenals, kidneys, and  pancreas are normal.  ?  Small bowel is of normal caliber. Large stool burden within the  rectum. Colonic diverticula are identified without adjacent  inflammatory changes. Normal air-filled appendix.  ?  The uterus is atretic. The bladder is distended and normal. No adnexal  mass. No free fluid in the abdomen or pelvis. The course and caliber  of the abdominal aorta is normal with calcified atheromatous disease.  ?  Post surgical changes of dynamic hip screw on the left. Hardware  appears uncomplicated. Diffuse osteopenia is noted. The soft tissues  are normal.  ?  IMPRESSION:?  ?  No acute process of the abdomen or pelvis. Secondary findings as  above.  ? [3]  ?  (01/19/2019 08:54 CST XR Chest 1 View)  Reason For Exam  Fever  ?  Radiology Report  EXAMINATION: AP view the chest  ?  EXAMINATION DATE: 1/19/2019  ?  COMPARISON: 5/20/2018  ?  TECHNIQUE: As above  ?  CLINICAL HISTORY: Fever  ?  FINDINGS:  ?  The cardiomediastinal silhouette and pulmonary vasculature are normal.  ?  Coarse interstitial markings of the lung parenchyma. No area of  consolidation. No pleural effusion.  ?  Post traumatic changes with prior  fixation of the clavicle and of the  scapula. Overall appearance is not significantly changed.  ?  IMPRESSION:?  ?  No acute cardiopulmonary process.  ? [4]     [1]?CT Lumbar Spine W/O Contrast; Tunde Gramajo DO 01/19/2019 10:20 CST  [2]?CT Head W/O Contrast; Adrian Srinivasan MD 01/19/2019 10:20 CST  [3]?CT Abdomen and Pelvis W/O Contrast; Tunde Gramajo DO 01/19/2019 10:20 CST  [4]?XR Chest 1 View; Tunde Gramajo DO 01/19/2019 08:54 CST

## 2022-09-13 NOTE — HISTORICAL [facility] CERNER
This is a historical note converted from [employer]. Formatting and pictures may have been removed.  Please reference [employer] for original formatting and attached multimedia. Chief Complaint  3 month follow up IMN LEFT FEMUR, AMBULATING WITH WALKER, STILL HAVING THIGH PAIN  History of Present Illness  Pt now?3 months?s/p?IMN left femur fx. Here for x-rays and f/u evaluation.?FWBAT LLE. Doing well. Mild left hip pain, controlled well with tramadol and tylenol. Aspirin for DVT ppx.  Review of Systems  Constitutional: negative except as stated in HPI  Eye: negative except as stated in HPI  ENMT: negative except as stated in HPI  Respiratory: negative except as stated in HPI  Cardiovascular: negative except as stated in HPI  Gastrointestinal: negative except as stated in HPI  Genitourinary: negative except as stated in HPI  Hema/Lymph: negative except as stated in HPI  Endocrine: negative except as stated in HPI  Immunologic: negative except as stated in HPI  Musculoskeletal: negative except as stated in HPI  Integumentary: negative except as stated in HPI  Neurologic: negative except as stated in HPI  ?   All Other ROS_ ?negative except as stated in HPI  Physical Exam  Vitals & Measurements  HR:?82(Peripheral)? RR:?20? BP:?103/63?  HT:?165?cm? HT:?165?cm? WT:?96.6?kg? WT:?96.6?kg? BMI:?35.48?  General-Alert, oriented, no fever, chills  HEENT-Normocephalic, EOMI, moist oral mucosa, neck supple and nontender  Respiratory-Nonlabored respirations, symmetric chest expansion, chest wall nontender  Cardiac-Regular rate and rhythm, Pulses palpable in all extremities, Normal peripheral perfusion  Gastrointestinal-Soft, nontender, nondistended  Hemo/Lymph-No lymphadenopathy  Bigdclboklpjvnt-KCR-bynpywqv is well healed. FROM hip. <ild hip flexor weakness.?+TA/GS, EHL/FHL intact. SILT distally.  ?Neurologic-Alert and oriented x4, cooperative  ?Dermatologic-Skin warm, pink, dry.  ?  Assessment/Plan  Closed displaced spiral fracture of  shaft of left femur  Ordered:  Office/Outpatient Visit Level 3 Established [id number] PC, Closed displaced spiral fracture of shaft of left femur, [facility], 08/20/18 10:06:00 CDT  PT/OT External Referral, 08/20/18 10:06:00 CDT, Closed displaced spiral fracture of shaft of left femur, Evaluate and Treat, 3 X Week, Standard Precautions, FWBAT with walker LLE. ROM, strengthening, stretching LLE. Gait training. All modalaties welcomed.  ?  Orders:  Clinic Follow-up PRN, 08/20/18 10:06:00 CDT, Future Order, [facility]  ?  ?  Doing well. Continue FWBAT LLE with walker. No restrictions. F/u on an as needed basis.   Problem List/Past Medical History  Ongoing  Carotid stenosis  Closed displaced spiral fracture of shaft of left femur  GERD - Gastro-esophageal reflux disease  Obesity  Osteoarthritis  Osteoporosis  RA (rheumatoid arthritis)  Right lumbosacral radiculopathy  Historical  Able to lie down  Arthritis  Cataracts  Exercise tolerance  GERD (gastroesophageal reflux disease)  Osteoporosis  Wears glasses  Procedure/Surgical History  Intramedullary Robby Insertion Femur (Left) (05/21/2018)  Reposition Left Upper Femur with Intramedullary Internal Fixation Device, Open Approach (05/21/2018)  55276 - LT CATARACT W/IOL 1 STA PHACO (Left) (05/03/2016)  Extracapsular cataract removal with insertion of intraocular lens prosthesis (1 stage procedure), manual or mechanical technique (eg, irrigation and aspiration or phacoemulsification) (05/03/2016)  Replacement of Left Lens with Synthetic Substitute, Percutaneous Approach (05/03/2016)  [id number] - RT CATARACT W/IOL 1 STA PHACO (Right) (04/05/2016)  Extracapsular cataract removal with insertion of intraocular lens prosthesis (1 stage procedure), manual or mechanical technique (eg, irrigation and aspiration or phacoemulsification) (04/05/2016)  Replacement of Right Lens with Synthetic Substitute, Percutaneous Approach (04/05/2016)  left wrist and right clavicle  fracture repair (09/10/2015)  Application of short arm splint (forearm to hand); static (09/07/2015)  Application of splint (09/07/2015)  Esophageal manometry (09/19/2013)  Esophageal motility (manometric study of the esophagus and/or gastroesophageal junction) study with interpretation and report;. (09/19/2013)  removal of freckle from left elbow  Thumb   Medications  aspirin 81 mg oral tablet, 81 mg= 1 tab(s), Oral, Daily  biotin 5000 mcg oral tablet, disintegrating, 5000 mcg= 1 tab(s), Oral, Daily  Caltrate 600 + D oral tablet, 1 tab(s), Oral, Daily  Centrum Silver oral tablet, 1 tab(s), Oral, Daily  famotidine 20 mg oral tablet, 20 mg= 1 tab(s), Oral, Once a day (at bedtime)  famotidine 40 mg oral tablet, 40 mg= 1 tab(s), Oral, qAM  folic acid 1 mg oral tablet, 1 mg= 1 tab(s), Oral, Daily, 3 refills  gabapentin 300 mg oral capsule, 300 mg= 1 cap(s), Oral, BID, 1 refills  Lexapro 5 mg oral tablet, 5 mg= 1 tab(s), Oral, Daily, 2 refills,? ?Not Taking per Prescriber  METHOTREXATE TAB 2.5MG, 20 mg= 8 tab(s), Oral, qWeek  Ocuvite oral tablet, 1 tab(s), Oral, Daily  prednisONE 5 mg oral tablet, 2.5 mg= 0.5 tab(s), Oral, Daily,? ?Not Taking per Prescriber  traMADol 50 mg oral tablet, See Instructions  Allergies  No Known Allergies  Social History  Alcohol - Denies Alcohol Use, 09/17/2013  Never, 03/11/2016  Employment/School  Retired, Work/School description: [employer] 08/02/2018  Exercise  Exercise duration: 0., 02/01/2018  Home/Environment  Lives with Spouse., 04/26/2016  Nutrition/Health  Regular, 02/01/2018  Sexual  Sexually active: No., 02/01/2018  Substance Abuse - Denies Substance Abuse, 09/17/2013  Never, 03/11/2016  Tobacco - Denies Tobacco Use, 09/17/2013  Never smoker Use:. Previous treatment: None., 08/02/2018  Family History  CAD (coronary artery disease): Father.  Cancer: Brother.  Hypertension.: Father.  Stroke: Mother and Father.  Health Maintenance  Health Maintenance  ???Pending?(in  the next year)  ??? ??Due?  ??? ? ? ?Functional Assessment due??08/20/18??and every 1??year(s)  ??? ? ? ?Pneumococcal Vaccine due??08/20/18??and every?  ??? ? ? ?Tetanus Vaccine due??08/20/18??and every 10??year(s)  ??? ??Due In Future?  ??? ? ? ?Fall Risk Assessment not due until??08/02/19??and every 1??year(s)  ???Satisfied?(in the past 1 year)  ??? ??Satisfied?  ??? ? ? ?Blood Pressure Screening on??08/20/18.??Satisfied by Tamika Coffey  ??? ? ? ?Body Mass Index Check on??08/20/18.??Satisfied by Tamika Coffey  ??? ? ? ?Depression Screening on??08/20/18.??Satisfied by Tamika Coffey  ??? ? ? ?Diabetes Screening on??07/23/18.??Satisfied by Barbara Bansal  ??? ? ? ?Fall Risk Assessment on??08/02/18.??Satisfied by Adelaida Thompson  ??? ? ? ?Lipid Screening on??09/25/18.??Satisfied by Mat May MD  ??? ? ? ?Obesity Screening on??08/20/18.??Satisfied by Tamika Coffey  ?  ?  Diagnostic Results  X-ray left femur shows acceptable alignment, intact hardware, evidence of interval consolidation noted  ?      Patient seen and examined  Agree with above

## 2022-09-13 NOTE — H&P
Patient:   Kassie Reyna            MRN: [id number]            FIN: [id number]               Age:   82 years     Sex:  Female     :  1935   Associated Diagnoses:   Displaced spiral fracture of shaft of left femur, initial encounter for closed fracture; Osteoarthritis; Osteoporosis; RA (rheumatoid arthritis)   Author:   Harsha Lorenzo MD      Basic Information   Source of history:  Self.    Present at bedside:  Family member, Medical personnel.    Referral source:  Direct physician admit.    History limitation:  None.       History of Present Illness   82-year-old female with significant past medical history of rheumatoid arthritis/osteoporosis . She will be admitted to [facility] bed for PT/OT . She was recently d/c form [facility] after a  ORIF . She had 2 prbc  while in [facility] .  She denie sany complaint at this time .    ,             Review of Systems   Constitutional:  No fever.    Ear/Nose/Mouth/Throat:  Negative.    Respiratory:  No shortness of breath, No cough, No hemoptysis, No wheezing.    Cardiovascular:  No chest pain, No tachycardia, No peripheral edema.    Gastrointestinal:  No nausea, No vomiting, No diarrhea, No constipation, No heartburn, No abdominal pain, No hematemesis.    Genitourinary:  No dysuria, No hematuria.    Hematology/Lymphatics:  Negative.    Endocrine:  No polyuria, No cold intolerance, No heat intolerance.    Immunologic:  Negative.    Musculoskeletal:  Negative.    Integumentary:  No rash.    Neurologic:  Alert and oriented X4, No confusion, No numbness, No headache.    Psychiatric:  No anxiety, No depression.    All other systems are negative      Health Status   Allergies:    Allergic Reactions (Selected)  No Known Allergies,    Allergies (1) Active Reaction  No Known Allergies None Documented     Current medications:  (Selected)   Inpatient Medications  Ordered  Colace 100 mg oral capsule: 100 mg, form: Cap, Oral, Daily, first dose 18 9:00:00  CDT  Dulcolax Laxative 10 mg RECTAL suppository: 10 mg, form: Supp, IA (rectal), Daily PRN for constipation, first dose 05/21/18 9:47:00 CDT, Constipation unrelieved by MOM and Miralax  IVF Normal Saline NS Infusion 1,000 mL: 1,000 mL, 1,000 mL, IV, 100 mL/hr, start date 05/21/18 9:47:00 CDT  Lexapro 10 mg oral tablet: 5 mg, form: Tab, Oral, Daily, first dose 05/21/18 9:00:00 CDT  Lexapro 10 mg oral tablet: 5 mg, form: Tab, Oral, Daily, first dose 05/25/18 9:00:00 CDT  Lovenox: 40 mg, form: Injection, Subcutaneous, Daily, first dose 05/23/18 9:00:00 CDT  Lovenox: 40 mg, form: Injection, Subcutaneous, Daily, first dose 05/25/18 9:00:00 CDT, for all risk catagories  MiraLax (polyethylene glycol 3350): 17 gm, form: Powder-Recon, Oral, Daily PRN for constipation, first dose 05/21/18 9:47:00 CDT, Constipation unrelieved by MOM  Norco 5 mg-325 mg oral tablet: 1 tab(s), form: Tab, Oral, q6hr PRN for pain, first dose 05/24/18 14:39:00 CDT, mild/moderate  Pantoprazole 40 mg ORAL EC-Tablet: 40 mg, form: Tab-EC, Oral, Daily, first dose 05/21/18 6:00:00 CDT  Percocet 5/325: 1 tab(s), form: Tab, Oral, q4hr PRN for as needed for pain, first dose 05/21/18 9:47:00 CDT  Percocet 5/325: 2 tab(s), form: Tab, Oral, q4hr PRN for as needed for pain, first dose 05/21/18 9:47:00 CDT  Martines Milk of Magnesia: 30 mL, form: Susp, Oral, At Bedtime PRN for constipation, first dose 05/21/18 9:47:00 CDT  Restoril: 15 mg, form: Cap, Oral, Once a day (at bedtime) PRN for sleep, first dose 05/22/18 19:06:00 CDT  Robaxin 750 mg oral tablet: 750 mg, form: Tab, Oral, BID PRN for spasm, first dose 05/21/18 9:47:00 CDT  Senokot S 50 mg-8.6 mg oral tablet: 2 tab(s), form: Tab, Oral, At Bedtime, first dose 05/21/18 21:00:00 CDT  Sodium Chloride 0.9% intravenous solution 1,000 mL: 1,000 mL, 1,000 mL, IV, 75 mL/hr, start date 05/20/18 18:36:00 CDT  Vitamin B12 500 mcg oral tablet: 500 mcg, form: Tab, Oral, Daily, first dose 05/25/18 9:00:00 CDT  Vitamin C  500 mg oral tablet: 500 mg, form: Tab, Oral, At Bedtime, first dose 05/21/18 21:00:00 CDT  Zofran INJ.  (IV Push / IM)  2 mg/mL: 4 mg, form: Injection, IV Push, q4hr PRN for nausea/vomiting, first dose 05/21/18 10:47:00 CDT  Zofran: 8 mg, form: Injection, IV Piggyback, q4hr PRN for nausea, first dose 05/24/18 14:39:00 CDT  aspirin 81 mg oral tablet, CHEWABLE: 81 mg, form: Tab-Chew, Oral, Daily, first dose 05/25/18 21:00:00 CDT  azelastine 137 mcg/inh (0.1%) nasal spray: 2 spray(s), 274 mcg =, form: Spray, Both Nostrils, BID, first dose 05/20/18 21:00:00 CDT  cloNIDine: 0.1 mg, form: Tab, Oral, q8hr PRN for hypertension, first dose 05/24/18 14:39:00 CDT, SBP > 180  famotidine: 40 mg, form: Tab, Oral, Daily, first dose 05/21/18 9:00:00 CDT  famotidine: 40 mg, form: Tab, Oral, Daily, first dose 05/25/18 9:00:00 CDT  folic acid 1 mg oral tablet: 1 mg, form: Tab, Oral, Daily, first dose 05/21/18 9:00:00 CDT  folic acid 1 mg oral tablet: 1 mg, form: Tab, Oral, Daily, first dose 05/25/18 21:00:00 CDT  labetalol: 20 mg, form: Soln, IV Push, q2hr PRN for hypertension, first dose 05/20/18 16:11:00 CDT, SBP > _170___ and/or DBP > _100___ not to exceed 300mg/24hrs  methotrexate 2.5 mg oral tablet: 20 mg, form: Tab, Oral, qWeek, first dose 05/25/18 17:00:00 CDT  morphine 4 mg/mL preservative-free injectable solution: 4 mg, form: Injection, IV Push, q3hr PRN for breakthru pain, first dose 05/21/18 9:47:00 CDT, ( > 7 on pain scale)  oxycodone 5 mg oral tablet: 5 mg, form: Tab, Oral, q6hr PRN for breakthru pain, first dose 05/21/18 9:47:00 CDT, ok to use in addition to percocet  prednisONE 5 mg oral tablet: 2.5 mg, form: Tab, Oral, Daily, first dose 05/21/18 9:00:00 CDT  prednisONE 5 mg oral tablet: 2.5 mg, form: Tab, Oral, Daily, first dose 05/25/18 9:00:00 CDT  scopolamine: 1.5 mg, form: Patch-72, TD, q72hr, first dose 05/21/18 17:00:00 CDT  traMADol: 50 mg, form: Tab, Oral, Daily, first dose 05/21/18 9:00:00 CDT  traMADol: 50  mg, form: Tab, Oral, Daily, first dose 18 9:00:00 CDT  traMADol: 50 mg, form: Tab, Oral, q6hr PRN for pain, first dose 18 20:05:00 CDT  Prescriptions  Prescribed  Lexapro 5 mg oral tablet: 5 mg = 1 tab(s), Oral, Daily, # 30 tab(s), 2 Refill(s), Pharmacy: [facility]  folic acid 1 mg oral tablet: 1 mg = 1 tab(s), Oral, Daily, # 90 tab(s), 3 Refill(s), Pharmacy: [facility]  meloxicam 7.5 mg oral tablet: 7.5 mg = 1 tab(s), Oral, Daily, take daily for shoulder pain, # 30 tab(s), 0 Refill(s), Pharmacy: [facility]  methotrexate 5 mg oral tablet: 20 mg = 4 tab(s), Oral, qWeek, X 90 day(s), # 52 tab(s), 3 Refill(s), Pharmacy: [facility]  prednisONE 5 mg oral tablet: 2.5 mg = 0.5 tab(s), Oral, Daily, # 40 tab(s), 0 Refill(s), Pharmacy: [facility]  Documented Medications  Documented  AZELASTINE   SPR 0.1%: 2 spray(s), Both Nostrils, BID  AZITHROMYCIN TAB 250MG: Oral, As Directed  BENZONATATE  CAP 200M mg = 1 cap(s), Oral, TID  Caltrate 600 + D oral tablet: 1 tab(s), Oral, Daily, # 60 tab(s), 0 Refill(s)  Centrum Silver oral tablet: 1 tab(s), Oral, Daily, # 30 tab(s), 0 Refill(s)  Lovenox: 40 mg = 0.4 mL, Subcutaneous, Daily, 0 Refill(s)  METHOTREXATE TAB 2.5M mg = 8 tab(s), Oral, qWeek  Ocuvite Plus oral tablet: Daily, 0 Refill(s)  Pantoprazole 40 mg ORAL EC-Tablet: 40 mg = 1 tab(s), Oral, Daily, # 30 tab(s), 0 Refill(s)  RANITIDINE   TAB 300M mg = 1 tab(s), Oral, qPM  TRAMADOL HCL TAB 50M mg = 1 tab(s), Oral, Daily  Vitamin B12 500 mcg oral tablet: 500 mcg = 1 tab(s), Oral, Daily, # 30 tab(s), 0 Refill(s)  aspirin 81 mg oral tablet: 81 mg = 1 tab(s), Oral, Daily, # 30 tab(s), 0 Refill(s)  biotin 1000 mcg oral tablet: 1,000 mcg = 1 tab(s), Oral, Daily, # 30 tab(s), 0 Refill(s)  famotidine 40 mg oral tablet: 40 mg = 1 tab(s), Oral, Daily, # 30 tab(s), 0 Refill(s)  oxycodone 5 mg oral tablet: 5 mg = 1 tab(s), Oral, q6hr, PRN PRN breakthru pain, 0  Refill(s)   Problem list:    All Problems  Carotid stenosis / SNOMED CT 688490091 / Confirmed  Closed displaced spiral fracture of shaft of left femur / SNOMED CT 18278492 / Confirmed  GERD - Gastro-esophageal reflux disease / SNOMED CT 9088698841 / Confirmed  Impaired mobility / SNOMED CT 530274363 / Confirmed  Osteoarthritis / SNOMED CT 1561332728 / Confirmed  Osteoporosis / SNOMED CT 992515768 / Confirmed  RA (rheumatoid arthritis) / SNOMED CT 237089663 / Confirmed,    Active Problems (7)  Carotid stenosis   Closed displaced spiral fracture of shaft of left femur   GERD - Gastro-esophageal reflux disease   Impaired mobility   Osteoarthritis   Osteoporosis   RA (rheumatoid arthritis)         Histories   Past Medical History:    Active  Carotid stenosis (966920279): Onset in 2013 at 78 years.  Resolved  GERD (gastroesophageal reflux disease) ([id number]):  Resolved.  Wears glasses ([id number]):  Resolved.  Cataracts (0146208875):  Resolved.  Able to lie down (897821715):  Resolved.  Exercise tolerance (629685215):  Resolved.  Arthritis (9078590):  Resolved.  Osteoporosis (154887696):  Resolved.   Family History:    Cancer  Brother  CAD (coronary artery disease)  Father  Hypertension.  Father     Procedure history:    Intramedullary Robby Insertion Femur (Left) on 5/21/2018 at 82 Years.  Comments:  5/21/2018 10:09 - Bobby ROGER, Cole TADEO  auto-populated from documented surgical case  45065 - LT CATARACT W/IOL 1 STA PHACO (Left) on 5/3/2016 at 80 Years.  Comments:  5/3/2016 11:42 - Genesis Virgen RN.  auto-populated from documented surgical case  79828 - RT CATARACT W/IOL 1 STA PHACO (Right) on 4/5/2016 at 80 Years.  Comments:  4/5/2016 08:39 - Lindsey Kowalski RN  auto-populated from documented surgical case  left wrist and right clavicle fracture repair on 9/10/2015 at 79 Years.  Thumb ([id number]).  Comments:  9/19/2013 10:34 - Milton Mcrae RN  Removal of  nodule  removal of freckle from left elbow.   Social History        Social & Psychosocial Habits    Alcohol  09/17/2013 Risk Assessment: Denies Alcohol Use    03/11/2016  Use: Never    Employment/School  04/26/2016  Status: Retired    Exercise  02/01/2018  Duration (average number of minutes): 0    Home/Environment  04/26/2016  Lives with: Spouse    Nutrition/Health  02/01/2018  Type of diet: Regular    Sexual  02/01/2018  Sexually active: No    Substance Abuse  09/17/2013 Risk Assessment: Denies Substance Abuse    03/11/2016  Use: Never    Tobacco  09/17/2013 Risk Assessment: Denies Tobacco Use    09/07/2015  Use: Never smoker  .        Physical Examination   General:  Alert and oriented, No acute distress.    Eye:  Pupils are equal, round and reactive to light, Extraocular movements are intact, Normal conjunctiva.    HENT:  Normocephalic, Tympanic membranes are clear.    Neck:  Supple, Non-tender, No carotid bruit, No jugular venous distention, No lymphadenopathy, No thyromegaly.    Respiratory:  Lungs are clear to auscultation, Breath sounds are equal, Symmetrical chest wall expansion, No chest wall tenderness.    Cardiovascular:  Normal rate, Regular rhythm, No murmur, No gallop, No edema.    Gastrointestinal:  Soft, Non-tender, Non-distended, Normal bowel sounds, No organomegaly.    Genitourinary:  No costovertebral angle tenderness.       Vital Signs (last 24 hrs)_____  Last Charted___________  Temp Oral     36.7 DegC  (MAY 24 16:00)  Resp Rate         20 br/min  (MAY 24 16:00)  SBP      116 mmHg  (MAY 24 16:00)  DBP      64 mmHg  (MAY 24 16:00)  SpO2      95 %  (MAY 24 16:00)     Measurements from flowsheet : Measurements   5/24/2018 13:15 CDT      Weight Dosing             68.7 kg                             Weight Measured and Calculated in Lbs     151.46 lb                             Weighing Method           Bed                             Height/Length Dosing      165 cm     Lymphatics:  No  lymphadenopathy neck, axilla, groin.    Musculoskeletal:  Normal range of motion, Normal strength, No swelling.    Integumentary:  Warm.    Neurologic:  Alert, Oriented, Normal motor function, No focal deficits, Cranial Nerves II-XII are grossly intact, Normal deep tendon reflexes.    Psychiatric:  Cooperative, Non-suicidal.       Review / Management   Results review:     Labs (Last four charted values)  WBC                  H 11.09 (MAY 24)   Hgb                  L 8.4 (MAY 24)   Hct                  L 25.5 (MAY 24)   Plt                  173 (MAY 24) .       Impression and Plan   Diagnosis     Displaced spiral fracture of shaft of left femur, initial encounter for closed fracture (QZD45-GN S72.342A).     Osteoarthritis (JZR94-XJ M19.90).     Osteoporosis (IVN17-QS M81.0).     RA (rheumatoid arthritis) (FGC38-MT M06.9).       cont PT/OT   resume home medication   monitor H/H

## 2022-09-13 NOTE — ED PROVIDER NOTES
Patient:   Kassie Reyna            MRN: [id number]            FIN: [id number]               Age:   83 years     Sex:  Female     :  1935   Associated Diagnoses:   Dementia; Adult failure to thrive; Weakness; Hypokalemia; Frequent falls   Author:   Kiki Faulkner MD      Basic Information   Time seen: Date & time 2019 08:35:00.   History source: Patient, family.   Arrival mode: Ambulance.   History limitation: None.   Additional information: Chief Complaint from Nursing Triage Note : Chief Complaint   2019 8:31 CST       Chief Complaint           c/o weakness loss appetite arthritis pain all over for several day.  arrived via [facility] from her home.  .      History of Present Illness   The patient presents with generalized weakness, back pain, problems getting off bed and poor appetite for 4 days. The patient is an 83 years old white female who came with the above complaints ( with her daughter and ). She has history of Rheumatoid arthritis, osteoporosis and multiple falls. She had broken her left wrist and left femur last year and still falling ( had 2 falls in the last 2 weeks). 4 days ago the family noticed that the patient does not want to eat or drink, does not want to get out of bed ( secondary to back pain), and when she last walked with the walker she was dragging her left foot. The patient hit her head in her fall few days ago, but does not recall which side and has no pain or swelling to the head now. Family is concerned about her not eating or drinking as well as not getting out of bed..        Review of Systems   Constitutional symptoms:  Weakness, fatigue, poor appetite, did not eat for 4 days..    Skin symptoms:  Negative except as documented in HPI.   Eye symptoms:  Negative except as documented in HPI.   ENMT symptoms:  Negative except as documented in HPI.   Respiratory symptoms:  Negative except as documented in HPI.   Cardiovascular symptoms:  No chest pain, no  palpitations.    Gastrointestinal symptoms:  No abdominal pain, no nausea, no vomiting.    Genitourinary symptoms:  Negative except as documented in HPI.   Musculoskeletal symptoms:  Back pain.   Neurologic symptoms:  Negative except as documented in HPI.   Psychiatric symptoms:  Negative except as documented in HPI.   Endocrine symptoms:  Negative except as documented in HPI.   Hematologic/Lymphatic symptoms:  Negative except as documented in HPI.   Allergy/immunologic symptoms:  Negative except as documented in HPI.             Additional review of systems information: All other systems reviewed and otherwise negative.      Health Status   Allergies:    Allergic Reactions (Selected)  No Known Allergies,    Allergies (1) Active Reaction  No Known Allergies None Documented.   Medications:  (Selected)   Prescriptions  Prescribed  donepezil 10 mg oral tablet: 10 mg = 1 tab(s), Oral, Once a day (at bedtime), # 30 tab(s), 1 Refill(s), Pharmacy: [facility]/pharmacy #[id number]  famotidine 40 mg oral tablet: 40 mg = 1 tab(s), Oral, qAM, # 30 tab(s), 3 Refill(s), [id number]  folic acid 1 mg oral tablet: 1 mg = 1 tab(s), Oral, Daily, # 90 tab(s), 3 Refill(s), Pharmacy: [id number]  Documented Medications  Documented  Caltrate 600 + D oral tablet: 1 tab(s), Oral, Daily, # 60 tab(s), 0 Refill(s)  Centrum Silver oral tablet: 1 tab(s), Oral, Daily, # 30 tab(s), 0 Refill(s)  HYDROXYZ MALLORY CAP 25M mg = 1 cap(s), Oral, Daily  METHOTREXATE TAB 2.5M mg = 8 tab(s), Oral, qWeek  OXYBUTYNIN   TAB 5MG ER: 5 mg = 1 tab(s), Oral, Daily  Ocuvite oral tablet: 1 tab(s), Oral, Daily, 0 Refill(s)  TRAMADOL HCL TAB 50MG:   aspirin 81 mg oral tablet: 81 mg = 1 tab(s), Oral, Daily, # 30 tab(s), 0 Refill(s).      Past Medical/ Family/ Social History   Medical history:    Active  Carotid stenosis (756914708): Onset in  at 78 years.  Resolved  GERD (gastroesophageal reflux disease) ([id number]):   Resolved.  Wears glasses ([id number]):  Resolved.  Cataracts ([id number]):  Resolved.  Able to lie down (322057708):  Resolved.  Exercise tolerance (850795259):  Resolved.  Arthritis (6040041):  Resolved.  Osteoporosis (220207667):  Resolved..   Surgical history:    Intramedullary Robby Insertion Femur (Left) on 2018 at 82 Years.  Comments:  2018 10:09 - Bobby ROGER, Cole TADEO  auto-populated from documented surgical case  [id number] - LT CATARACT W/IOL 1 STA PHACO (Left) on 5/3/2016 at 80 Years.  Comments:  5/3/2016 11:42 - Genesis Virgen RN.  auto-populated from documented surgical case  [id number] - RT CATARACT W/IOL 1 STA PHACO (Right) on 2016 at 80 Years.  Comments:  2016 08:39 - Lindsey Kowalski RN  auto-populated from documented surgical case  left wrist and right clavicle fracture repair on 9/10/2015 at 79 Years.  Thumb ([id number]).  Comments:  2013 10:34 - Thor ROGER, Milton STANLEY  Removal of nodule  removal of freckle from left elbow..   Family history:    Cancer  Brother  Stroke  Father ()  Mother ()  CAD (coronary artery disease)  Father ()  Hypertension.  Father ()  .   Problem list:    Active Problems (9)  Carotid stenosis   Closed displaced spiral fracture of shaft of left femur   GERD - Gastro-esophageal reflux disease   Impaired mobility   Obesity   Osteoarthritis   Osteoporosis   RA (rheumatoid arthritis)   Right lumbosacral radiculopathy .      Physical Examination               Vital Signs   Vital Signs   2019 8:41 CST       Oxygen Therapy            Room air    2019 8:31 CST       Temperature Oral          37.1 DegC                             Temperature Oral (calculated)             98.78 DegF                             Peripheral Pulse Rate     75 bpm                             Respiratory Rate          18 br/min                             SpO2                      95 %                             Oxygen Therapy             Room air                             Systolic Blood Pressure   146 mmHg  HI                             Diastolic Blood Pressure  88 mmHg  .      Vital Signs (last 24 hrs)_____  Last Charted___________  Temp Oral     37.1 DegC  (JAN 19 08:31)  Heart Rate Peripheral   75 bpm  (JAN 19 08:31)  Resp Rate         18 br/min  (JAN 19 08:31)  SBP      H 146mmHg  (JAN 19 08:31)  DBP      88 mmHg  (JAN 19 08:31)  SpO2      95 %  (JAN 19 08:31).   Measurements   1/19/2019 8:31 CST       Weight Dosing             70 kg                             Weight Measured and Calculated in Lbs     154.32 lb                             Weight Estimated          70 kg                             Height/Length Dosing      170 cm                             Height/Length Estimated   170 cm                             Body Mass Index Estimated 24.22 kg/m2  .   Basic Oxygen Information   1/19/2019 8:41 CST       Oxygen Therapy            Room air    1/19/2019 8:31 CST       SpO2                      95 %                             Oxygen Therapy            Room air  .   General:  Alert, no acute distress, patient looks underweight..    Skin:  Warm, dry, pink, intact, a small bruise to the right side of the nose just under the right eye, from falling few days ago..    Head:  Normocephalic, atraumatic.    Neck:  Supple, trachea midline, no tenderness, no JVD, no carotid bruit.    Eye:  Pupils are equal, round and reactive to light, extraocular movements are intact, normal conjunctiva, vision grossly normal.    Ears, nose, mouth and throat:  Tympanic membranes clear, oral mucosa moist, no pharyngeal erythema or exudate.    Cardiovascular:  Regular rate and rhythm, No murmur, Normal peripheral perfusion, No edema.    Respiratory:  Lungs are clear to auscultation, respirations are non-labored, breath sounds are equal, Symmetrical chest wall expansion.    Chest wall:  No tenderness.   Back:  Nontender, Normal range of motion.    Musculoskeletal:   Normal ROM, normal strength, no swelling, no deformity, some tenderness to the left hip area but pelvis is stable. ( had Fx of the left hip and had surgery in May of 2018)..    Gastrointestinal:  Soft, Nontender, Non distended, Normal bowel sounds, No organomegaly.    Genitourinary:  No tenderness.   Neurological:  Alert and oriented to person, place, time, and situation, No focal neurological deficit observed, CN II-XII intact, normal sensory observed, normal motor observed, normal speech observed, normal coordination observed.    Lymphatics:  No lymphadenopathy.   Psychiatric:  Cooperative, appropriate mood & affect, normal judgment.       Medical Decision Making   Electrocardiogram:  Time 1/19/2019 08:30:00, rate 77, normal sinus rhythm, no ectopy, normal CT & QRS intervals, EP Interp, Normal sinus rhythm at 77 / minute.  LVH by voltage criteria.  Nonspecific ST and T wave abnormalities..    Results review:     Labs (Last four charted values)  WBC                  H 13.70 (JAN 19)   Hgb                  13.3 (JAN 19)   Hct                  41.5 (JAN 19)   Plt                  307 (JAN 19)   Na                   136 (JAN 19)   K                    L 3.3 (JAN 19)   CO2                  H 32.1 (JAN 19)   Cl                   L 97 (JAN 19)   Cr                   H 1.07 (JAN 19)   BUN                  H 23 (JAN 19)   Glucose Random       102 (JAN 19)   PT                   H 10.4 (JAN 19)   INR                  1.10 (JAN 19) .   Chest X-Ray:  No acute disease process.   Head Computed Tomography:  No intracranial hemorrhage, no midline shift, no mass effect, interpretation by Radiologist.    Radiology results:  Computed tomography, LS spine, reviewed radiologist's report, reveals no acute disease process.    Notes:  CT of the abdomen and pelvis are also negative for acute abnormalities or broken bones..      Reexamination/ Reevaluation   Vital signs   Basic Oxygen Information   1/19/2019 8:41 CST       Oxygen Therapy             Room air    1/19/2019 8:31 CST       SpO2                      95 %                             Oxygen Therapy            Room air     CT of the head, LS spine, and abdomen with  pelvis showed no acute abnormalities aside from the degenerative disease of the spine. Will call her private physician for possible admission for nursing home replacement as it is not safe to send the patient home with only her elderly  taking care of her.   12:20  Spoke to the hospitalist, DR Thompson and he accepted the admission. CT of the head, LS spine and abdomen / pelvis are negative for acute abnormalities. Patient is admitted for failure to thrive and back pains. She probably will need placement to nursing home.      Procedure   Procedure notes:   critical care 45 minutes.      Impression and Plan   Diagnosis   Dementia (NSI10-JL F03.90)   Adult failure to thrive (ESL76-PY R62.7)   Weakness (GIL41-HN R53.1)   Hypokalemia (EIH37-VX E87.6)   Frequent falls (QVD47-KI R29.6)   Plan   Condition: Stable.    Disposition: Admit time  1/19/2019 12:20:00, Place in Observation Unit.

## 2022-09-13 NOTE — DISCHARGE SUMMARY
Patient:   Kassie Reyna            MRN: [id number]            FIN: [id number]               Age:   82 years     Sex:  Female     :  1935   Associated Diagnoses:   None   Author:   Attila Sosa MD      Discharge Information      Discharge Summary Information   sic Information   Admit information:  82-year-old female with significant past medical history of rheumatoid arthritis/osteoporosis admitted to hospitalist service on 2018 status post mechanical fall suffering a left femoral fracture.  Orthopedics consulted and the patient was admitted to hospitalist service.  Hemodynamically stable.  Mild renal insufficiency for which she was initiated on IV fluids.   ,    Today's Information:  CC-status post surgery.  Patient was seen and examined at bedside.  Family is at bedside.  She is status post intramedullary fixation Dean pt. Pt will be transfer to swing bed to [facility]. ongoing medical tx. dean po diet       Physical Examination      Vital Signs (last 24 hrs)_____  Last Charted___________  Temp Oral     37 DegC  (MAY 24 07:)  Heart Rate Peripheral   90 bpm  (MAY 24 07:)  SBP      114 mmHg  (MAY 24 07:)  DBP      66 mmHg  (MAY 24 07:)  SpO2      L 92%  (MAY 24 07:)     General:  Alert and oriented, No acute distress.    Eye:  Pupils are equal, round and reactive to light, Extraocular movements are intact.    HENT:  Normocephalic.    Neck:  Supple, Non-tender.    Respiratory:  Lungs are clear to auscultation.    Cardiovascular:  Normal rate, Regular rhythm.    Gastrointestinal:  Soft, Non-tender.    Musculoskeletal:  Normal range of motion.    Integumentary:  Warm, Dry.    Neurologic:  Alert, Oriented.       Discharge Plan   Discharge Summary Plan   Discharge Status: improved.     Diagnosis       Left femoral fracture-status post intramedullary nail fixation today   Acute kidney injury-mild  History of rheumatoid arthritis      Delirium     dc summary took more 35 mins  .      Education and Follow-up   Counseled: patient, family.

## 2022-09-13 NOTE — CONSULTS
Patient:   Kassie Reyna            MRN: [id number]            FIN: [id number]               Age:   82 years     Sex:  Female     :  1935   Associated Diagnoses:   None   Author:   Dary CARDENAS MD, Adrian Angel      Chief Complaint   2018 13:50 CDT      pt reports dizziness w/ ground level fall 1hr ago. EMS reports obvious deformity to L upper leg on scene. traction applied on arrival. +2 ped pulse intact. denies LOC, denies use of thinners. ox4 on arrival. denies pain at this time. hx of osteoporosis  (Modified)       History of Present Illness   82-year-old female with significant past medical history as noted below.  She was in her usual state of health until today afternoon.  Patient reports that today afternoon while she was cleaning up her dryer and she was bending over, she felt dizzy and fell backwards hitting the refrigerator behind her.  After the incident she could not stand up by herself and was having a lot of left hip pain.  Patient was brought to the ED by family members.  Upon initial evaluation patient is hemodynamically stable except for mildly elevated blood pressure.  Labs remarkable for very mild acute renal insufficiency.  Otherwise unremarkable.  Plain x-ray revealed left femoral fracture.  Orthopedics consulted and patient was admitted to hospitalist service     She denies other pain      Review of Systems   Constitutional:  No fever, No chills, No sweats.    Eye:  Negative except as documented in history of present illness.    Ear/Nose/Mouth/Throat:  Negative except as documented in history of present illness.    Respiratory:  No shortness of breath, No cough.    Cardiovascular:  No chest pain, No palpitations.    Gastrointestinal:  No nausea, No vomiting, No diarrhea.    Genitourinary:  Negative except as documented in history of present illness.    Hematology/Lymphatics:  Negative except as documented in history of present illness.    Endocrine:  Negative except as  documented in history of present illness.    Immunologic:  Negative except as documented in history of present illness.    Musculoskeletal:  Negative except as documented in history of present illness.    Integumentary:  Negative except as documented in history of present illness.    Neurologic:  Alert and oriented X4, No confusion, No numbness, No tingling.       Health Status   Allergies:    Allergies (1) Active Reaction  No Known Allergies None Documented     Current medications:  (Selected)   Inpatient Medications  Ordered  Colace 100 mg oral capsule: 100 mg, form: Cap, Oral, Daily, first dose 05/22/18 9:00:00 CDT  Dulcolax Laxative 10 mg RECTAL suppository: 10 mg, form: Supp, NC (rectal), Daily PRN for constipation, first dose 05/21/18 9:47:00 CDT, Constipation unrelieved by MOM and Miralax  IVF Normal Saline NS Infusion 1,000 mL: 1,000 mL, 1,000 mL, IV, 100 mL/hr, start date 05/21/18 9:47:00 CDT  Lexapro 10 mg oral tablet: 5 mg, form: Tab, Oral, Daily, first dose 05/21/18 9:00:00 CDT  MiraLax (polyethylene glycol 3350): 17 gm, form: Powder-Recon, Oral, Daily PRN for constipation, first dose 05/21/18 9:47:00 CDT, Constipation unrelieved by MOM  Ofirmev: 1,000 mg, form: Infusion, IV Piggyback, q6hr PRN for breakthru pain, Infuse over: 15 minute(s), Order duration: 24 hr, first dose 05/20/18 16:11:00 CDT, stop date 05/21/18 16:10:00 CDT  Pantoprazole 40 mg ORAL EC-Tablet: 40 mg, form: Tab-EC, Oral, Daily, first dose 05/21/18 6:00:00 CDT  Percocet 5/325: 1 tab(s), form: Tab, Oral, q4hr PRN for as needed for pain, first dose 05/21/18 9:47:00 CDT  Percocet 5/325: 2 tab(s), form: Tab, Oral, q4hr PRN for as needed for pain, first dose 05/21/18 9:47:00 CDT  Martines Milk of Magnesia: 30 mL, form: Susp, Oral, At Bedtime PRN for constipation, first dose 05/21/18 9:47:00 CDT  Robaxin 750 mg oral tablet: 750 mg, form: Tab, Oral, BID PRN for spasm, first dose 05/21/18 9:47:00 CDT  Senokot S 50 mg-8.6 mg oral tablet: 2  tab(s), form: Tab, Oral, At Bedtime, first dose 05/21/18 21:00:00 CDT  Sodium Chloride 0.9% intravenous solution 1,000 mL: 1,000 mL, 1,000 mL, IV, 75 mL/hr, start date 05/20/18 18:36:00 CDT  Vitamin C 500 mg oral tablet: 500 mg, form: Tab, Oral, At Bedtime, first dose 05/21/18 21:00:00 CDT  Zofran INJ.  (IV Push / IM)  2 mg/mL: 4 mg, form: Injection, IV Push, q4hr PRN for nausea/vomiting, first dose 05/21/18 10:47:00 CDT  azelastine 137 mcg/inh (0.1%) nasal spray: 2 spray(s), 274 mcg =, form: Spray, Both Nostrils, BID, first dose 05/20/18 21:00:00 CDT  ceFAZolin: 2 gm, form: Infusion, IV Piggyback, q6hr, Infuse over: 30 minute(s), Order duration: 3 dose(s), first dose 05/21/18 14:00:00 CDT, stop date 05/22/18 7:59:00 CDT, Last dose within 24hrs of surgery end time  famotidine: 40 mg, form: Tab, Oral, Daily, first dose 05/21/18 9:00:00 CDT  folic acid 1 mg oral tablet: 1 mg, form: Tab, Oral, Daily, first dose 05/21/18 9:00:00 CDT  labetalol: 20 mg, form: Soln, IV Push, q2hr PRN for hypertension, first dose 05/20/18 16:11:00 CDT, SBP > _170___ and/or DBP > _100___ not to exceed 300mg/24hrs  morphine 4 mg/mL preservative-free injectable solution: 4 mg, form: Injection, IV Push, q3hr PRN for breakthru pain, first dose 05/21/18 9:47:00 CDT, ( > 7 on pain scale)  oxycodone 5 mg oral tablet: 5 mg, form: Tab, Oral, q6hr PRN for breakthru pain, first dose 05/21/18 9:47:00 CDT, ok to use in addition to percocet  prednisONE 5 mg oral tablet: 2.5 mg, form: Tab, Oral, Daily, first dose 05/21/18 9:00:00 CDT  traMADol: 50 mg, form: Tab, Oral, Daily, first dose 05/21/18 9:00:00 CDT  Prescriptions  Prescribed  Lexapro 5 mg oral tablet: 5 mg = 1 tab(s), Oral, Daily, # 30 tab(s), 2 Refill(s), Pharmacy: [facility]  albuterol CFC free 90 mcg/inh inhalation aerosol with adapter: 2 puff(s), INH, QID, PRN PRN for wheezing, # 1 EA, 0 Refill(s), Pharmacy: [facility]  folic acid 1 mg oral tablet: 1 mg = 1 tab(s), Oral,  Daily, # 90 tab(s), 3 Refill(s), Pharmacy: [facility]  meloxicam 7.5 mg oral tablet: 7.5 mg = 1 tab(s), Oral, Daily, take daily for shoulder pain, # 30 tab(s), 0 Refill(s), Pharmacy: [facility]  methotrexate 5 mg oral tablet: 20 mg = 4 tab(s), Oral, qWeek, X 90 day(s), # 52 tab(s), 3 Refill(s), Pharmacy: [facility]  prednisONE 5 mg oral tablet: 2.5 mg = 0.5 tab(s), Oral, Daily, # 40 tab(s), 0 Refill(s), Pharmacy: [facility]  Documented Medications  Documented  AZELASTINE   SPR 0.1%: 2 spray(s), Both Nostrils, BID  AZITHROMYCIN TAB 250MG: Oral, As Directed  BENZONATATE  CAP 200M mg = 1 cap(s), Oral, TID  Caltrate 600 + D oral tablet: 1 tab(s), Oral, Daily, # 60 tab(s), 0 Refill(s)  Centrum Silver oral tablet: 1 tab(s), Oral, Daily, # 30 tab(s), 0 Refill(s)  METHOTREXATE TAB 2.5M mg = 8 tab(s), Oral, qWeek  Ocuvite Plus oral tablet: Daily, 0 Refill(s)  Pantoprazole 40 mg ORAL EC-Tablet: 40 mg = 1 tab(s), Oral, Daily, # 30 tab(s), 0 Refill(s)  RANITIDINE   TAB 300M mg = 1 tab(s), Oral, qPM  TRAMADOL HCL TAB 50M mg = 1 tab(s), Oral, Daily  Vitamin B12 500 mcg oral tablet: 500 mcg = 1 tab(s), Oral, Daily, # 30 tab(s), 0 Refill(s)  aspirin 81 mg oral tablet: 81 mg = 1 tab(s), Oral, Daily, # 30 tab(s), 0 Refill(s)  biotin 1000 mcg oral tablet: 1,000 mcg = 1 tab(s), Oral, Daily, # 30 tab(s), 0 Refill(s)  famotidine 40 mg oral tablet: 40 mg = 1 tab(s), Oral, Daily, # 30 tab(s), 0 Refill(s)   Problem list:    All Problems  Carotid stenosis / SNOMED CT 991301812 / Confirmed  GERD - Gastro-esophageal reflux disease / SNOMED CT 1713498236 / Confirmed  Osteoarthritis / SNOMED CT 1897923772 / Confirmed  Osteoporosis / SNOMED CT 751666720 / Confirmed  RA (rheumatoid arthritis) / SNOMED CT 572904819 / Confirmed,    Active Problems (5)  Carotid stenosis   GERD - Gastro-esophageal reflux disease   Osteoarthritis   Osteoporosis   RA (rheumatoid arthritis)         Histories    Past Medical History:    Active  Carotid stenosis (315078741): Onset in 2013 at 78 years.  Resolved  GERD (gastroesophageal reflux disease) ([id number]):  Resolved.  Wears glasses ([id number]):  Resolved.  Cataracts (3949522415):  Resolved.  Able to lie down (197680871):  Resolved.  Exercise tolerance (242796949):  Resolved.  Arthritis (9180988):  Resolved.  Osteoporosis (748830541):  Resolved.   Family History:    Cancer  Brother  CAD (coronary artery disease)  Father  Hypertension.  Father     Procedure history:    Intramedullary Robby Insertion Femur (Left) on 5/21/2018 at 82 Years.  Comments:  5/21/2018 10:09 - Cole Rosales RN  auto-populated from documented surgical case  42279 - LT CATARACT W/IOL 1 STA PHACO (Left) on 5/3/2016 at 80 Years.  Comments:  5/3/2016 11:42 - Genesis Virgen RN.  auto-populated from documented surgical case  [id number] - RT CATARACT W/IOL 1 STA PHACO (Right) on 4/5/2016 at 80 Years.  Comments:  4/5/2016 08:39 - Lindsey Kowalski RN  auto-populated from documented surgical case  left wrist and right clavicle fracture repair on 9/10/2015 at 79 Years.  Thumb ([id number]).  Comments:  9/19/2013 10:34 - Milton Mcrae RN  Removal of nodule  removal of freckle from left elbow.   Social History        Social & Psychosocial Habits    Alcohol  09/17/2013 Risk Assessment: Denies Alcohol Use    03/11/2016  Use: Never    Employment/School  04/26/2016  Status: Retired    Exercise  02/01/2018  Duration (average number of minutes): 0    Home/Environment  04/26/2016  Lives with: Spouse    Nutrition/Health  02/01/2018  Type of diet: Regular    Sexual  02/01/2018  Sexually active: No    Substance Abuse  09/17/2013 Risk Assessment: Denies Substance Abuse    03/11/2016  Use: Never    Tobacco  09/17/2013 Risk Assessment: Denies Tobacco Use    09/07/2015  Use: Never smoker  .        Physical Examination      Vital Signs (last 24 hrs)_____  Last  Charted___________  Temp Oral     36.7 DegC  (MAY 21 03:00)  Heart Rate Peripheral   87 bpm  (MAY 21 10:50)  Resp Rate         12 br/min  (MAY 21 10:50)  SBP      118 mmHg  (MAY 21 10:50)  DBP      L 48mmHg  (MAY 21 10:50)  SpO2      100 %  (MAY 21 11:00)  Weight      69.8 kg  (MAY 20 16:16)  Height      165 cm  (MAY 20 16:16)  BMI      25.64  (MAY 20 16:16)     General:  Alert and oriented, No acute distress.    Eye:  Extraocular movements are intact, Normal conjunctiva.    HENT:  Normocephalic.    Neck:  Supple, Non-tender.    Respiratory:  Respirations are non-labored.    Cardiovascular:  Normal rate, Regular rhythm.    Gastrointestinal:  Soft, Non-tender, Non-distended.    Musculoskeletal:  Left lower family: Skin intact. Tender to palpation about the hip. 2+ dorsalis pedis pulse. Sensation intact to light touch. Neurovascular intact distally.    Integumentary:  Pink, Intact.    Cognition and Speech:  Oriented, Speech clear and coherent.       Health Maintenance      Health Maintenance     Pending (in the next year)        Due            Tetanus Vaccine due  05/21/18  and every 10  year(s)        Refused            Influenza Vaccine due  10/02/18  and every 1  year(s)        Due In Future            Depression Screening not due until  05/01/19  and every 1  year(s)           Body Mass Index Check not due until  05/20/19  and every 1  year(s)           Obesity Screening not due until  05/20/19  and every 1  year(s)     Satisfied (in the past 1 year)        Satisfied            Blood Pressure Screening on  05/21/18.  Satisfied by Terence Mccormick RN           Body Mass Index Check on  05/20/18.  Satisfied by Shavonne Jerome RN           Depression Screening on  05/01/18.  Satisfied by Cecy San LPN           Diabetes Screening on  05/22/18.  Satisfied by Adrian Foote III, MD           Obesity Screening on  05/20/18.  Satisfied by Shavonne Jerome RN          Review / Management   Results  review:     Labs (Last four charted values)  WBC                  8.9 (MAY 20)   Hgb                  L 11.4 (MAY 20)   Hct                  L 35.9 (MAY 20)   Plt                  232 (MAY 20)   Na                   141 (MAY 20)   K                    4.3 (MAY 20)   CO2                  27.0 (MAY 20)   Cl                   106 (MAY 20)   Cr                   H 1.11 (MAY 20)   BUN                  H 19.0 (MAY 20)   Glucose Random       H 137 (MAY 20)   PT                   12.7 (MAY 20)   INR                  0.93 (MAY 20)   PTT                  L 20.1 (MAY 20) .      X-rays show a spiral femoral shaft/subtrochanteric femur fracture      Impression and Plan   82-year-old female with left displaced spiral femur fracture  -Unfortunately this is an operative injury. Nonoperative treatment yields a poor outcome  -Risk benefits alternatives to surgery were discussed with the patient and her son and they voice understanding and elect to proceed  -We'll proceed with this tomorrow morning

## 2022-09-13 NOTE — OP NOTE
DATE OF SURGERY:    05/21/2018    SURGEON:  Adrian Foote MD    PREOPERATIVE DIAGNOSIS:  Left spiral femur fracture of proximal third of femur.    POSTOPERATIVE DIAGNOSIS:  Left spiral femur fracture of proximal third of femur.    PROCEDURE:  Intramedullary nailing of left spiral femur fracture in the proximal third.    INDICATIONS:  This is an operative injury.  I discussed the risks, benefits and alternatives to the injury with her with the patient, as well as her  and her son.  I discussed the risks of infection, need for revision surgery and bleeding.  They voiced understanding of the risks and elected to proceed.    PROCEDURE IN DETAIL:  The patient was taken to the operating room and general endotracheal anesthesia was administered.  She was laid supine on a Jairo table.  Left hip was bumped.  Left hip was prescrubbed with Hibiclens and prepped and draped in the usual sterile fashion.  After timeout, antibiotics were dosed.  We placed a 2-0 Anila wire in the distal anterior third of the femur and 10 pounds of weight off the end of the bed.  Because she had a spiral component in the proximal third, we felt that the best way to achieve rigid anatomic reduction was to make a small approach.  We made a straight lateral approach and dissected down to the IT band in line with this and then lifted the vastus lateralis in a subvastus fashion to evaluate the fracture.  We cleaned it and I clamped it into place with _____ type clamp.  I placed two Synthes cables above and below and obtained x-rays showing excellent anatomic reduction.  We made a 1-inch incision one handsbreadth proximal greater trochanter.  We placed the entry wire for a Synthes 125 degree neck shift angle TFNA.  At the tip of the greater trochanter, reamed over this level and passed a ball-tip guide wire down to the level of the knee, careful to make sure that it was posterior to our distal Anila wire.  We were using for  traction.  We measured 400 mm nail and reamed up to a 12 and placed an 11 mm x 400 mm left Synthes TFNA.  We locked it proximally with one lag screw, which we statically locked in place.  As the fracture did not extend up into the intertrochanteric region or across the neck, we did not need to compress this region.  We used perfect [place] technique to place two distal interlock screws.  As we were placing the very first one, her bone is actually so compromised that it did start to break and then we changed and placed two screws in the other holes.  We still had excellent fixation distally despite this.  The wound was copiously irrigated.  X-rays showed anatomic reduction in the AP and lateral planes.  Anila bow was removed.  Deep tissues closed with #1 Vicryl figure-of-eight interrupted, followed by 2-0 Vicryl and followed by staples.  Wounds were dressed     with Xeroform, 4x4 gauze and Medipore tape.  The patient was awakened, taken to PACU in stable condition.        ______________________________  MD CARLEY Espinoza/OFELIA  DD:  05/21/2018  Time:  02:08PM  DT:  05/22/2018  Time:  12:45PM  Job #:  [id number]

## 2022-09-13 NOTE — ED PROVIDER NOTES
"   Patient:   Kassie Reyna            MRN: [id number]            FIN: [id number]               Age:   82 years     Sex:  Female     :  1935   Associated Diagnoses:   Closed displaced subtrochanteric fracture of left femur   Author:   Rosalio KIMBALL, Dalton SHERMAN      Basic Information   Time seen: Date & time 2018 14:04:00.   History source: Patient, family.   Arrival mode: Ambulance.   History limitation: None.   Additional information: Chief Complaint from Nursing Triage Note : Chief Complaint   2018 13:50 CDT      Chief Complaint           pt reports dizziness w/ ground level fall 1hr ago. EMS reports obvious deformity to L upper leg on scene. traction applied on arrival. +2 ped pulse intact. denies LOC, denies use of thinners. ox4 on arrival. denies pain at this time. hx of osteoporosis  (Modified) .      History of Present Illness   The patient presents following 83 y/o C female with hx of osteoporosis presents to the ED via EMS, accompanied by family, c/o left hip pain after a fall 1 hour PTA. Pt states she stood up straight from being bent over, and felt "dizzy and drunk" before she fell backwards, hitting the refrigerator behind her, noting she could not stand up and had left hip pain. .  The onset was 1 hour PTA.  The occurrence was single episode.  The fall was described as dizziness.  The location where the incident occurred was at home.  Location: Left Hip. The character of symptoms is pain.  The degree at present is moderate.  The exacerbating factor is changing position.  The relieving factor is immobilization.  Risk factors consist of age and   Anticoagulants.  The patient's dominant hand is unknown.  Therapy today: emergency medical services.  Preceding symptoms dizziness.  Associated symptoms: dizziness.        Review of Systems   Constitutional symptoms:  Negative except as documented in HPI.   Skin symptoms:  Negative except as documented in HPI.   Eye symptoms:  Negative " except as documented in HPI.   ENMT symptoms:  Negative except as documented in HPI.   Respiratory symptoms:  Negative except as documented in HPI.   Cardiovascular symptoms:  Negative except as documented in HPI.   Gastrointestinal symptoms:  Negative except as documented in HPI.   Genitourinary symptoms:  Negative except as documented in HPI.   Musculoskeletal symptoms:    Left hip pain.   Neurologic symptoms:  Dizziness.   Psychiatric symptoms:  Negative except as documented in HPI.   Endocrine symptoms:  Negative except as documented in HPI.   Hematologic/Lymphatic symptoms:  Negative except as documented in HPI.   Allergy/immunologic symptoms:  Negative except as documented in HPI.             Additional review of systems information: All other systems reviewed and otherwise negative.      Health Status   Allergies:    Allergic Reactions (Selected)  No Known Allergies.   Medications:  (Selected)   Inpatient Medications  Ordered  fentaNYL: 50 mcg, form: Injection, IV Push, Once, first dose 05/20/18 14:09:00 CDT, stop date 05/20/18 14:09:00 CDT, STAT  Prescriptions  Prescribed  Lexapro 5 mg oral tablet: 5 mg = 1 tab(s), Oral, Daily, # 30 tab(s), 2 Refill(s), Pharmacy: [facility]  albuterol CFC free 90 mcg/inh inhalation aerosol with adapter: 2 puff(s), INH, QID, PRN PRN for wheezing, # 1 EA, 0 Refill(s), Pharmacy: [facility]  folic acid 1 mg oral tablet: 1 mg = 1 tab(s), Oral, Daily, # 90 tab(s), 3 Refill(s), Pharmacy: [facility]  meloxicam 7.5 mg oral tablet: 7.5 mg = 1 tab(s), Oral, Daily, take daily for shoulder pain, # 30 tab(s), 0 Refill(s), Pharmacy: [facility]  methotrexate 5 mg oral tablet: 20 mg = 4 tab(s), Oral, qWeek, X 90 day(s), # 52 tab(s), 3 Refill(s), Pharmacy: [facility]  prednisONE 5 mg oral tablet: 2.5 mg = 0.5 tab(s), Oral, Daily, # 40 tab(s), 0 Refill(s), Pharmacy: [facility]  Documented Medications  Documented  Centrum Silver oral  tablet: 1 tab(s), Oral, Daily, # 30 tab(s), 0 Refill(s)  Ocuvite Plus oral tablet: Daily, 0 Refill(s)  Pantoprazole 40 mg ORAL EC-Tablet: 40 mg = 1 tab(s), Oral, Daily, # 30 tab(s), 0 Refill(s)  Vitamin B12 500 mcg oral tablet: 500 mcg = 1 tab(s), Oral, Daily, # 30 tab(s), 0 Refill(s)  aspirin 81 mg oral tablet: 81 mg = 1 tab(s), Oral, Daily, # 30 tab(s), 0 Refill(s)  famotidine 40 mg oral tablet: 40 mg = 1 tab(s), Oral, Daily, # 30 tab(s), 0 Refill(s).      Past Medical/ Family/ Social History   Medical history:    Active  Carotid stenosis ([id number]): Onset in 2013 at 78 years.  Resolved  Able to lie down (601261571):  Resolved.  Arthritis (3009037):  Resolved.  Cataracts ([id number]):  Resolved.  Exercise tolerance (722564803):  Resolved.  GERD (gastroesophageal reflux disease) ([id number]):  Resolved.  Osteoporosis (753531651):  Resolved.  Wears glasses ([id number]):  Resolved..   Surgical history:    41088 - LT CATARACT W/IOL 1 STA PHACO (Left) performed by Marquez Lara MD on 5/3/2016 at 80 Years.  Comments:  5/3/2016 11:42 - Genesis Virgen RN.  auto-populated from documented surgical case  01305 - RT CATARACT W/IOL 1 STA PHACO (Right) performed by Marquez Lara MD on 4/5/2016 at 80 Years.  Comments:  4/5/2016 08:39 - Lindsey Kowalski RN  auto-populated from documented surgical case  left wrist and right clavicle fracture repair on 9/10/2015 at 79 Years.  Thumb (SNOMED CT [id number]).  Comments:  9/19/2013 10:34 - Thor ROGER, Milton STANLEY  Removal of nodule  removal of freckle from left elbow..   Family history:    Brother  Cancer  Father  Hypertension.  CAD (coronary artery disease)  .   Social history: Alcohol use: Denies, Tobacco use: Denies, Drug use: Denies.      Physical Examination               Vital Signs   Vital Signs   5/20/2018 13:50 CDT      Temperature Oral          36.7 DegC                             Temperature Oral (calculated)             98.06  DegF                             Peripheral Pulse Rate     95 bpm                             Respiratory Rate          18 br/min                             SpO2                      94 %                             Oxygen Therapy            Room air                             Systolic Blood Pressure   132 mmHg                             Diastolic Blood Pressure  65 mmHg  .   Measurements   5/20/2018 13:50 CDT      Weight Dosing             68 kg                             Weight Measured and Calculated in Lbs     149.91 lb                             Weight Estimated          68 kg  .   Basic Oxygen Information   5/20/2018 13:50 CDT      SpO2                      94 %                             Oxygen Therapy            Room air  .   General:  Alert, no acute distress.    Skin:  Warm, dry, pink.    Head:  Normocephalic, atraumatic.    Neck:  Supple, trachea midline, no tenderness, no JVD, no carotid bruit.    Eye:  Pupils are equal, round and reactive to light, extraocular movements are intact, normal conjunctiva, vision unchanged.    Ears, nose, mouth and throat:  Tympanic membranes clear, oral mucosa moist, no pharyngeal erythema or exudate.    Cardiovascular:  Regular rate and rhythm, No murmur, Normal peripheral perfusion, No edema.    Respiratory:  Lungs are clear to auscultation, respirations are non-labored, breath sounds are equal, Symmetrical chest wall expansion.    Chest wall:  No tenderness, No deformity.    Back:  Nontender, Normal range of motion, Normal alignment.    Musculoskeletal:  Normal ROM, normal strength, no swelling, no deformity, Lower extremity: Left, lateral, hip, tenderness,   LLE in hare traction.    Gastrointestinal:  Soft, Nontender, Non distended, Normal bowel sounds, No organomegaly.    Neurological:  Alert and oriented to person, place, time, and situation, No focal neurological deficit observed, CN II-XII intact, normal sensory observed, normal motor observed, normal speech  observed, normal coordination observed.    Lymphatics:  No lymphadenopathy.   Psychiatric:  Cooperative, appropriate mood & affect, normal judgment.       Medical Decision Making   Documents reviewed:  Emergency department nurses' notes.   Orders  Laboratory    CBC w/ Auto Diff, Dalton Santamaria MD, 05/20/18, 14:09, Ordered    CMP, Dalton Santamaria MD, 05/20/18, 14:09, Ordered    PT, Dalton Santamaria MD, 05/20/18, 14:09, Ordered    PTT, Dalton Santamaria MD, 05/20/18, 14:09, Ordered    Urinalysis Complete a reflex to culture, Dalton aSntamaria MD, 05/20/18, 14:09, Ordered    Automated Diff, Dalton Santamaria MD, 05/20/18, 14:28, Ordered  Xray    XR Pelvis 1 or 2 Views, Dalton Santamaria MD, 05/20/18, 14:10, Ordered    XR Hip Left 2 Views, Dalton Santamaria MD, 05/20/18, 14:10, Ordered    XR Chest 1 View, Dalton Santamaria MD, 05/20/18, 14:10, Ordered  EKG / Respiratory / Ancillary    EKG, Dalton Santamaria MD, 05/20/18, 14:10, Ordered    O2 Therapy, ER, Physician, 05/20/18, 14:11, Ordered .   Results review:  Lab results : Lab View   5/20/2018 14:27 CDT      Sodium Lvl                141 mmol/L                             Potassium Lvl             4.3 mmol/L                             Chloride                  106 mmol/L                             CO2                       27.0 mmol/L                             Calcium Lvl               8.2 mg/dL  LOW                             Glucose Lvl               137 mg/dL  HI                             BUN                       19.0 mg/dL  HI                             Albumin Lvl               3.40 gm/dL                             WBC                       8.9 x10(3)/mcL                             RBC                       3.67 x10(6)/mcL  LOW                             Hgb                       11.4 gm/dL  LOW                             Hct                       35.9 %  LOW                              Platelet                  232 x10(3)/mcL                             MCV                       97.8 fL  HI                             MCH                       31.1 pg  HI                             MCHC                      31.8 gm/dL  LOW                             RDW                       16.4 %                             MPV                       10.4 fL                             Abs Neut                  7.20 x10(3)/mcL                             Neutro Auto               80 %  NA                             Lymph Auto                10 %  NA                             Mono Auto                 6 %  NA                             Eos Auto                  2 %  NA                             Abs Eos                   0.2 x10(3)/mcL                             Basophil Auto             0 %  NA                             Abs Neutro                7.20 x10(3)/mcL                             Abs Lymph                 0.9 x10(3)/mcL                             Abs Mono                  0.6 x10(3)/mcL                             Abs Baso                  0.0 x10(3)/mcL  .   Radiology results:  Rad Results (ST)  < 12 hrs   Accession: [id number]  Order: XR Chest 1 View  Report Dt/Tm: 05/20/2018 14:53  Report:   XR Chest 1 View     REASON FOR STUDY: Dyspnea     COMPARISON: Radiographs from 5/17/2017     FINDINGS: Cardiac silhouette and mediastinal contours are within  normal limits. The lungs are well-inflated. No consolidation  identified. Similar very mild right apical pleural thickening. No  pleural effusion or discernible pneumothorax. Prior internal fixation  of the right clavicle with remote right rib fractures and a chronic  appearing right humeral fracture.     IMPRESSION: No acute pulmonary process identified.          Accession: [id number]  Order: XR Pelvis 1 or 2 Views  Report Dt/Tm: 05/20/2018 14:51  Report:      XR Pelvis 1 or 2 Views, XR Hip Left 2 Views     REASON FOR EXAM: Trauma      COMPARISON:None.     FINDINGS:     There is an acute displaced left femoral intertrochanteric and  subtrochanteric fracture. There is no dislocation.      .      Impression and Plan   Diagnosis   Closed displaced subtrochanteric fracture of left femur ([id number] S72.22XA)      Calls-Consults   -  5/20/2018 14:58:00 , Dary CARDENAS MD, Adrian Angel, ortho, phone call, recommends admit, OK to eat, NPO after MN, Garrido's traction.    Plan   Condition: Stable.    Disposition: Admit time  5/20/2018 14:59:00, Admit to Inpatient Unit.    Counseled: Patient, Family, Regarding diagnosis, Regarding diagnostic results, Regarding treatment plan.    Notes: I, Dennis Talamantes, acted solely as a scribe for and in the presence of Dr. Santamaria who performed the service., I, Dr. Santamaria, performed all activities above as documented and I am in full agreement with the above documentation..

## 2022-09-13 NOTE — HISTORICAL [facility] CERNER
This is a historical note converted from [employer]. Formatting and pictures may have been removed.  Please reference [employer] for original formatting and attached multimedia. Chief Complaint  7 WEEK POST LEFT FEMUR IMN ON 5/21/18. HAS P.T.GOING TO HER HOUSE. SHE IS HAVING SOME LATERAL FEMUR PAIN. WALKING WITH A WALKER.  History of Present Illness  Pt now 6 weeks s/p?IMN left femur fx. Here for x-rays and f/u evaluation. 50% WB LLE. Doing well. Mild left hip pain, controlled well with tramadol and tylenol. Aspirin for DVT ppx.  Review of Systems  Review of Systems?  ????Constitutional: ?Negative. ?  ????Eye: ?Negative. ?  ????Ear/Nose/Mouth/Throat: ?Negative. ?  ????Respiratory: ?Negative. ?  ????Cardiovascular: ?Negative. ?  ????Gastrointestinal: ?Negative. ?  ????Genitourinary: ?Negative. ?  ????Hematology/Lymphatics: ?Negative. ?  ????Endocrine: ?Negative. ?  ????Immunologic: ?Negative. ?  ????Musculoskeletal: ?Negative except HPI. ?  ????Integumentary: ?Negative. ?  ????Neurologic: ?Negative. ?  ????Psychiatric: ?Negative. ?  ????All other systems are negative  Physical Exam  Vitals & Measurements  BP:?115/72?  HT:?165?cm? HT:?165?cm? WT:?65.5?kg? WT:?65.5?kg? BMI:?24.06?  ????General-Alert, oriented, no fever, chills  ????HEENT-Normocephalic, EOMI, moist oral mucosa, neck supple and nontender  ????Respiratory-Nonlabored respirations, symmetric chest expansion, chest wall nontender  ????Cardiac-Regular rate and rhythm, Pulses palpable in all extremities, Normal peripheral perfusion  ????Gastrointestinal-Soft, nontender, nondistended  ????Hemo/Lymph-No lymphadenopathy  ????Nqevdfkkxntqgic-TTT-Cennjrqt is well-healed. Mild TTP over greater troch bursa. FROM hip. Mild hip flexor weakness. +TA/GS, EHL/FHL intact. SILT distally.  ????Neurologic-Alert and oriented x4, cooperative  ????Dermatologic-Skin warm, pink, dry.  Assessment/Plan  1.?Closed displaced spiral fracture of shaft of left femur  ?  Doing well.?Advance to  FWBAT LLE with walker. PT?orders provided for HH. RTC 6 weeks for?final x-rays and release if all goes well.  ?  Ordered:  Clinic Follow up, *Est. 08/20/18 3:00:00 CDT, Order for future visit, Closed displaced spiral fracture of shaft of left femur, [facility]  Post-Op follow-up visit [id number] PC, [facility] 07/09[facility] 13:21:00 CDT  PT/OT External Referral, 07/09/18 13:21:00 CDT, Closed displaced spiral fracture of shaft of left femur, Evaluate and Treat, 3 X Week, Standard Precautions, Advance to FWBAT LLE. FROM left hip, knee. All modalities welcomed. Gait training. Stretching/strengthening.  ?   Problem List/Past Medical History  Ongoing  Carotid stenosis  Closed displaced spiral fracture of shaft of left femur  GERD - Gastro-esophageal reflux disease  Osteoarthritis  Osteoporosis  RA (rheumatoid arthritis)  Historical  Able to lie down  Arthritis  Cataracts  Exercise tolerance  GERD (gastroesophageal reflux disease)  Osteoporosis  Wears glasses  Procedure/Surgical History  Intramedullary Robby Insertion Femur (Left) (05/21/2018), Reposition Left Upper Femur with Intramedullary Internal Fixation Device, Open Approach (05/21/2018), 01121 - LT CATARACT W/IOL 1 STA PHACO (Left) (05/03/2016), Extracapsular cataract removal with insertion of intraocular lens prosthesis (1 stage procedure), manual or mechanical technique (eg, irrigation and aspiration or phacoemulsification) (05/03/2016), Replacement of Left Lens with Synthetic Substitute, Percutaneous Approach (05/03/2016), 06574 - RT CATARACT W/IOL 1 STA PHACO (Right) (04/05/2016), Extracapsular cataract removal with insertion of intraocular lens prosthesis (1 stage procedure), manual or mechanical technique (eg, irrigation and aspiration or phacoemulsification) (04/05/2016), Replacement of Right Lens with Synthetic Substitute, Percutaneous Approach (04/05/2016), left wrist and right clavicle  fracture repair (09/10/2015), Application of short arm splint (forearm to hand); static (09/07/2015), Application of splint (09/07/2015), Esophageal manometry (09/19/2013), Esophageal motility (manometric study of the esophagus and/or gastroesophageal junction) study with interpretation and report;. (09/19/2013), removal of freckle from left elbow, Thumb.  Medications  aspirin 81 mg oral tablet, 81 mg= 1 tab(s), Oral, Daily  AZELASTINE SPR 0.1%, 2 spray(s), Both Nostrils, Once a day (at bedtime)  biotin 1000 mcg oral tablet, 1000 mcg= 1 tab(s), Oral, Daily  Caltrate 600 + D oral tablet, 1 tab(s), Oral, Daily  Centrum Silver oral tablet, 1 tab(s), Oral, Daily  famotidine 40 mg oral tablet, 40 mg= 1 tab(s), Oral, Daily  folic acid 1 mg oral tablet, 1 mg= 1 tab(s), Oral, Daily, 3 refills  gabapentin 300 mg oral capsule, 300 mg= 1 cap(s), Oral, BID, 1 refills  Lexapro 5 mg oral tablet, 5 mg= 1 tab(s), Oral, Daily, 2 refills  METHOTREXATE TAB 2.5MG, 20 mg= 8 tab(s), Oral, qWeek  Pantoprazole 40 mg ORAL EC-Tablet, 40 mg= 1 tab(s), Oral, Daily  prednisONE 5 mg oral tablet, 2.5 mg= 0.5 tab(s), Oral, Daily  RANITIDINE TAB 300MG, 300 mg= 1 tab(s), Oral, qPM  Vitamin B12 500 mcg oral tablet, 500 mcg= 1 tab(s), Oral, Daily  Allergies  No Known Allergies  Social History  Alcohol - Denies Alcohol Use, 09/17/2013  Never, 03/11/2016  Employment/School  Retired, 04/26/2016  Exercise  Exercise duration: 0., 02/01/2018  Home/Environment  Lives with Spouse., 04/26/2016  Nutrition/Health  Regular, 02/01/2018  Sexual  Sexually active: No., 02/01/2018  Substance Abuse - Denies Substance Abuse, 09/17/2013  Never, 03/11/2016  Tobacco - Denies Tobacco Use, 09/17/2013  Never smoker, 09/07/2015  Family History  CAD (coronary artery disease): Father.  Cancer: Brother.  Hypertension.: Father.  Health Maintenance  Health Maintenance  ???Pending?(in the next year)  ??? ??Due?  ??? ? ? ?Functional Assessment due??07/09/18??and every 1??year(s)  ???  ? ? ?Tetanus Vaccine due??07/09/18??and every 10??year(s)  ??? ??Refused?  ??? ? ? ?Influenza Vaccine due??10/02/18??and every 1??year(s)  ??? ??Due In Future?  ??? ? ? ?Fall Risk Assessment not due until??06/25/19??and every 1??year(s)  ???Satisfied?(in the past 1 year)  ??? ??Satisfied?  ??? ? ? ?Blood Pressure Screening on??07/09/18.??Satisfied by Anitha Hassan  ??? ? ? ?Body Mass Index Check on??07/09/18.??Satisfied by Anitha Hassan  ??? ? ? ?Depression Screening on??07/09/18.??Satisfied by Anitha Hassan  ??? ? ? ?Diabetes Screening on??09/25/18.??Satisfied by Mat May MD  ??? ? ? ?Fall Risk Assessment on??06/25/18.??Satisfied by Cecy San LPN  ??? ? ? ?Lipid Screening on??06/12/18.??Satisfied by Mat May MD  ??? ? ? ?Obesity Screening on??07/09/18.??Satisfied by Anitha Hassan  ??? ? ? ?Tobacco Use Screening on??07/09/18.??Satisfied by Anitha Hassan  ?  ?  Diagnostic Results  X-ray left femur shows acceptable alignment, intact hardware, evidence of interval consolidation noted.      Patient seen and examined  Agree with above

## 2022-09-23 NOTE — HISTORICAL [facility]ER
This is a historical note converted from [employer]. Formatting and pictures may have been removed.  Please reference [employer] for original formatting and attached multimedia. Chief Complaint  6 months s/p IMN left femur, continued pain in left lateral thigh.  History of Present Illness  Pt now?6 months?s/p?IMN left femur fx. Here for x-rays and f/u evaluation.?FWBAT LLE with walker. Complaining of left thigh/lateral thigh pain. Pain started about 1 week ago. She had an LESI 1 week ago for back pain. HO RA, osteoarthritis, and osteoporosis. Doing well overall.  Review of Systems  Constitutional: negative except as stated in HPI  Eye: negative except as stated in HPI  ENMT: negative except as stated in HPI  Respiratory: negative except as stated in HPI  Cardiovascular: negative except as stated in HPI  Gastrointestinal: negative except as stated in HPI  Genitourinary: negative except as stated in HPI  Hema/Lymph: negative except as stated in HPI  Endocrine: negative except as stated in HPI  Immunologic: negative except as stated in HPI  Musculoskeletal: negative except as stated in HPI  Integumentary: negative except as stated in HPI  Neurologic: negative except as stated in HPI  ?   All Other ROS_ ?negative except as stated in HPI  Physical Exam  Vitals & Measurements  HR:?70(Peripheral)? RR:?20? BP:?128/78?  HT:?165?cm? HT:?165?cm? WT:?59?kg? WT:?59?kg? BMI:?21.67?  General-Alert, oriented, no fever, chills  Kaqdahtvlhvtjdg-FRF-ryboyycv well healed. FROM left hip without significant pain. Mild hip flexor weakness. +TA/GS, EHL/FHL intact. SILT distally.  ?Neurologic-Alert and oriented x4, cooperative  ?Dermatologic-Skin warm, pink, dry.  Assessment/Plan  1.?Closed displaced spiral fracture of shaft of left femur?S72.342D  Ordered:  Office/Outpatient Visit Level 3 Established [id number] PC, Closed displaced spiral fracture of shaft of left femur  Left leg pain, [facility], 11/20/18 9:46:00 CST  ?  2.?Left leg  pain?M79.605  Ordered:  Office/Outpatient Visit Level 3 Established 89672 PC, Closed displaced spiral fracture of shaft of left femur  Left leg pain, [facility], 11/20/18 9:46:00 CST  ?  Orders:  Clinic Follow-up PRN, 11/20/18 9:46:00 CST, Future Order, [facility]  ?  ?  Patient doing well. Continue FWBAT LLE with walker. Continue to work with home therapy. She has a f/u apt with Dr. Cavanaugh next week; f/u after her injection. Suggested patient try OTC Tylenol Arthritis. She will f/u on PRN basis for new/worsening orthopedic issues.   Problem List/Past Medical History  Ongoing  Carotid stenosis  Closed displaced spiral fracture of shaft of left femur  GERD - Gastro-esophageal reflux disease  Obesity  Osteoarthritis  Osteoporosis  RA (rheumatoid arthritis)  Right lumbosacral radiculopathy  Historical  Able to lie down  Arthritis  Cataracts  Exercise tolerance  GERD (gastroesophageal reflux disease)  Osteoporosis  Wears glasses  Procedure/Surgical History  Intramedullary Robby Insertion Femur (Left) (05/21/2018)  98550 - LT CATARACT W/IOL 1 STA PHACO (Left) (05/03/2016)  33984 - RT CATARACT W/IOL 1 STA PHACO (Right) (04/05/2016)  left wrist and right clavicle fracture repair (09/10/2015)  removal of freckle from left elbow  Thumb   Medications  aspirin 81 mg oral tablet, 81 mg= 1 tab(s), Oral, Daily  biotin 5000 mcg oral tablet, disintegrating, 5000 mcg= 1 tab(s), Oral, Daily  Caltrate 600 + D oral tablet, 1 tab(s), Oral, Daily  Centrum Silver oral tablet, 1 tab(s), Oral, Daily  donepezil 5 mg oral tablet, See Instructions, 2 refills  famotidine 40 mg oral tablet, 40 mg= 1 tab(s), Oral, qAM  folic acid 1 mg oral tablet, 1 mg= 1 tab(s), Oral, Daily, 3 refills  gabapentin 300 mg oral capsule, 300 mg= 1 cap(s), Oral, BID, 1 refills  METHOTREXATE TAB 2.5MG, 20 mg= 8 tab(s), Oral, qWeek  Mobic 7.5 mg oral tablet, 7.5 mg= 1 tab(s), Oral, Daily, 1 refills  Ocuvite oral tablet, 1 tab(s), Oral, Daily  Allergies  No Known  Allergies  Social History  Alcohol - Denies Alcohol Use, 09/17/2013  Never, 03/11/2016  Employment/School  Retired, Work/School description: [employer] 08/02/2018  Exercise  Exercise duration: 0., 02/01/2018  Home/Environment  Lives with Spouse., 04/26/2016  Nutrition/Health  Regular, 02/01/2018  Sexual  Sexually active: No., 02/01/2018  Substance Abuse - Denies Substance Abuse, 09/17/2013  Never, 03/11/2016  Tobacco - Denies Tobacco Use, 09/17/2013  Never smoker, N/A, 11/20/2018  Never smoker, No, 11/07/2018  Family History  CAD (coronary artery disease): Father.  Cancer: Brother.  Hypertension.: Father.  Stroke: Mother and Father.  Health Maintenance  Health Maintenance  ???Pending?(in the next year)  ??? ??Due?  ??? ? ? ?Cognitive Screening due??11/20/18??and every 1??year(s)  ??? ? ? ?Geriatric Depression Screening due??11/20/18??and every 1??year(s)  ??? ? ? ?Pneumococcal Vaccine due??11/20/18??and every?  ??? ? ? ?Tetanus Vaccine due??11/20/18??and every 10??year(s)  ??? ??Due In Future?  ??? ? ? ?ADL Screening not due until??06/20/19??and every 1??year(s)  ??? ? ? ?Advance Directive not due until??09/11/19??and every 1??year(s)  ??? ? ? ?Functional Assessment not due until??09/11/19??and every 1??year(s)  ??? ? ? ?Fall Risk Assessment not due until??11/07/19??and every 1??year(s)  ???Satisfied?(in the past 1 year)  ??? ??Satisfied?  ??? ? ? ?ADL Screening on??06/20/18.??Satisfied by Sherly Noel LPN  ??? ? ? ?Advance Directive on??09/11/18.??Satisfied by Olivia Quintana LPN  ??? ? ? ?Blood Pressure Screening on??11/20/18.??Satisfied by Tamika Coffey.  ??? ? ? ?Body Mass Index Check on??11/20/18.??Satisfied by Tamika Coffey.  ??? ? ? ?Depression Screening on??11/20/18.??Satisfied by Tamika Coffey.  ??? ? ? ?Diabetes Screening on??09/28/18.??Satisfied by Venu Villalobos  ??? ? ? ?Fall Risk Assessment on??11/07/18.??Satisfied by Olivia Quintana LPN  ??? ? ? ?Functional Assessment  on??09/11/18.??Satisfied by Olivia Quintana LPN  ??? ? ? ?Influenza Vaccine on??09/28/18.??Satisfied by Olivia Quintana LPN  ??? ? ? ?Lipid Screening on??09/28/18.??Satisfied by Venu Villalobos  ??? ? ? ?Obesity Screening on??11/20/18.??Satisfied by Tamika Coffey  ?  ?  Diagnostic Results  X-ray left femur shows acceptable alignment, intact hardware, evidence of interval consolidation noted  ?      Discharged

## 2022-10-03 NOTE — DISCHARGE SUMMARY
Patient:   Kassie Reyna            MRN: [id number]            FIN: [id number]               Age:   83 years     Sex:  Female     :  1935   Associated Diagnoses:   None   Author:   Merry KIMBALL, Hemant Campbell      Discharge Information      Discharge Summary Information   Admit/Discharge Dates   Admit Date: 2019  Discharge Date: 2019   Physicians   Attending Physician - Donna KIMBALL, Denton GARDINER  Admitting Physician - Donna KIMBALL, Denton GARDINER  Consulting Physician - Donna KIMBALL, Denton GARDINER  Consulting Physician - Merry KIMBALL, Hemant Campbell  Primary Care Physician - Merry KIMBALL, Hemant Campbell   Discharge Diagnosis   Unspecified osteoarthritis, unspecified site (M19.90)   Rheumatoid arthritis, unspecified (M06.9)   Unspecified dementia without behavioral disturbance (F03.90)   Adult failure to thrive (R62.7)   Essential (primary) hypertension (I10)   Hypokalemia (E87.6)   Unspecified protein-calorie malnutrition (E46)   Major depressive disorder, single episode, unspecified (F32.9)   Left lower quadrant abdominal tenderness (R10.814)    Procedures   No procedures recorded for this visit.   Immunizations   No immunizations recorded for this visit.   Discharge Medications   Continue  aspirin (aspirin 81 mg oral tablet) 81 mg, Oral, Daily  calcium-vitamin D (Caltrate 600 + D oral tablet) 1 tab(s), Oral, Daily  famotidine (famotidine 40 mg oral tablet) 40 mg, Oral, qAM  fesoterodine (Toviaz 4 mg oral tablet, extended release) 4 mg, Oral, Daily  folic acid (folic acid 1 mg oral tablet) 1 mg, Oral, Daily  multivitamin with minerals (Centrum Silver oral tablet) 1 tab(s), Oral, Daily  multivitamin with minerals (Ocuvite Extra oral tablet) 1 tab(s), Oral, Daily  multivitamin with minerals (Ocuvite oral tablet) 1 tab(s), Oral, Daily  traMADol (traMADol 50 mg oral tablet) 50 mg, Oral, BID  Discontinue  donepezil (donepezil 10 mg oral tablet) 10 mg, Oral, Once a day (at bedtime)  hydrOXYzine (HYDROXYZ MALLORY CAP  125 ml of contrast were injected throughout the case. 75 mL of contrast was the total wasted during the case. 200 mL was the total amount used during the case. 25MG) 25 mg, Oral, Daily  hydrOXYzine (hydrOXYzine hydrochloride) 50 mg, Oral, Daily  methotrexate (METHOTREXATE TAB 2.5MG) 20 mg, Oral, qWeek  methotrexate (methotrexate 2.5 mg oral tablet) 15 mg, Oral, qWeek  oxybutynin (OXYBUTYNIN   TAB 5MG ER) 5 mg, Oral, Daily  traMADol (TRAMADOL HCL TAB 50MG)      Education   Discharge - 01/24/19 12:11:00 CST, Home Please discharge home with [facility]       Followup   [facility] Course   Hospital Course   This is an 83-year-old female with past medical history of dementia, osteoporosis, rheumatoid arthritis presents to the ED for increasing weakness. Symptoms have been ongoing for the last week. At baseline she could ambulate with a walker but now she can barely get around the home without assistance. Associated with decreased p.o. intake and new onset RUE tremor. Family denies any recent stressors. Patient denies any nausea, vomiting, dysuria, cough or congestion. In the ED her labs showed her being hypokalemic and mild BONNIE. Her CT of the head was negative, CT of the lumbosacral spine was negative for acute fracture, CT the abdomen without contrast showed diverticulosis in the colon with stool and hepatic steatosis. Chest x-ray was negative. UA was negative for WBCs or leukocyte esterase. [facility] was consulted for admission for failure to thrive and generalized weakness.   Electrolyte abnormalities were corrected.  Patient's overall status continued to digress.  Patient was refusing any p.o. intake/meds.  Blood pressure was also noted to be elevated during this admit.  Treatment options were discussed with family.  All were in agreement that patient was to be made comfort measures only.  DNR paperwork was signed.  Patient continued to deteriorate.  After further discussion with family, decision was made for hospice consult.  Patient evaluated by hospice [facility] and [facility] hospice.  Family has decided to place patient on hospice with hospice of  [facility].  Patient will be discharged today 1/24/19.        Physical Examination      Vital Signs (last 24 hrs)_____  Last Charted___________  Temp Axillary     36.8 DegC  (JAN 24 08:00)  Heart Rate Peripheral   92 bpm  (JAN 24 08:00)  Resp Rate         24 br/min  (JAN 24 08:00)  SBP      H 172mmHg  (JAN 24 08:00)  DBP      H 101mmHg  (JAN 24 08:00)  SpO2      L 90%  (JAN 24 08:00)  Weight      42.6 kg  (JAN 24 00:32)   Intake and Output   Fluid Balance Primitives   1/23/2019 15:00 CST      Urine Count               4                             Stool Count               0    1/23/2019 9:30 CST       Sodium Chloride 0.9% with KCl 20 mEq/L    Begin Bag 1,000 mL mL    1/23/2019 7:00 CST       Oral Intake               0 mL                             Urine Count               4                             Stool Count               1        General:  No acute distress.    Respiratory:  Lungs are clear to auscultation, Respirations are non-labored, Breath sounds are equal.    Cardiovascular:  Normal rate, Regular rhythm, No murmur.    Gastrointestinal:  Soft, Non-tender, Non-distended.    Integumentary:  Warm.    Psychiatric:       Behavior: Relaxed.       Discharge Plan   Discharge Summary Plan   Discharge disposition: discharge to home (into the care of family member, [facility]).     Prescriptions: Home medication to be determined by hospice.